# Patient Record
Sex: MALE | Race: WHITE | NOT HISPANIC OR LATINO | URBAN - METROPOLITAN AREA
[De-identification: names, ages, dates, MRNs, and addresses within clinical notes are randomized per-mention and may not be internally consistent; named-entity substitution may affect disease eponyms.]

---

## 2018-05-03 PROBLEM — E78.5 HYPERLIPIDEMIA: Status: ACTIVE | Noted: 2018-05-03

## 2018-05-03 RX ORDER — LISINOPRIL 2.5 MG/1
2.5 TABLET ORAL DAILY
COMMUNITY
Start: 2018-01-16 | End: 2018-05-11 | Stop reason: SDUPTHER

## 2018-05-03 RX ORDER — ATORVASTATIN CALCIUM 20 MG/1
20 TABLET, FILM COATED ORAL DAILY
COMMUNITY
Start: 2017-05-12 | End: 2018-05-11 | Stop reason: SDUPTHER

## 2018-05-03 RX ORDER — GLIPIZIDE 10 MG/1
10 TABLET ORAL SEE ADMIN INSTRUCTIONS
COMMUNITY
Start: 2017-05-12 | End: 2018-05-11 | Stop reason: SDUPTHER

## 2018-05-04 ENCOUNTER — CLINICAL SUPPORT (OUTPATIENT)
Dept: FAMILY MEDICINE | Facility: CLINIC | Age: 63
End: 2018-05-04
Payer: COMMERCIAL

## 2018-05-04 DIAGNOSIS — E11.21 TYPE II DIABETES MELLITUS WITH NEPHROPATHY (CMS/HCC): Primary | ICD-10-CM

## 2018-05-04 PROCEDURE — 36415 COLL VENOUS BLD VENIPUNCTURE: CPT | Performed by: FAMILY MEDICINE

## 2018-05-07 LAB
ALBUMIN SERPL-MCNC: 4 G/DL (ref 3.6–5.1)
ALBUMIN/CREAT UR: 37 MCG/MG CREAT
ALBUMIN/GLOB SERPL: 1.1 (CALC) (ref 1–2.5)
ALP SERPL-CCNC: 86 U/L (ref 40–115)
ALT SERPL-CCNC: 23 U/L (ref 9–46)
AST SERPL-CCNC: 21 U/L (ref 10–35)
BILIRUB SERPL-MCNC: 0.8 MG/DL (ref 0.2–1.2)
BUN SERPL-MCNC: 15 MG/DL (ref 7–25)
BUN/CREAT SERPL: ABNORMAL (CALC) (ref 6–22)
CALCIUM SERPL-MCNC: 8.9 MG/DL (ref 8.6–10.3)
CHLORIDE SERPL-SCNC: 104 MMOL/L (ref 98–110)
CHOLEST SERPL-MCNC: 174 MG/DL
CHOLEST/HDLC SERPL: 3.7 (CALC)
CO2 SERPL-SCNC: 27 MMOL/L (ref 20–31)
CREAT SERPL-MCNC: 0.96 MG/DL (ref 0.7–1.25)
CREAT UR-MCNC: 212 MG/DL (ref 20–370)
ERYTHROCYTE [DISTWIDTH] IN BLOOD BY AUTOMATED COUNT: 13.5 % (ref 11–15)
GFR SERPL CREATININE-BSD FRML MDRD: 83 ML/MIN/1.73M2
GLOBULIN SER CALC-MCNC: 3.5 G/DL (CALC) (ref 1.9–3.7)
GLUCOSE SERPL-MCNC: 172 MG/DL (ref 65–99)
HBA1C MFR BLD: 10.1 % OF TOTAL HGB
HCT VFR BLD AUTO: 46.2 % (ref 38.5–50)
HDLC SERPL-MCNC: 47 MG/DL
HGB BLD-MCNC: 15.1 G/DL (ref 13.2–17.1)
LDLC SERPL CALC-MCNC: 106 MG/DL (CALC)
MCH RBC QN AUTO: 28.8 PG (ref 27–33)
MCHC RBC AUTO-ENTMCNC: 32.7 G/DL (ref 32–36)
MCV RBC AUTO: 88 FL (ref 80–100)
MICROALBUMIN UR-MCNC: 7.8 MG/DL
NONHDLC SERPL-MCNC: 127 MG/DL (CALC)
PLATELET # BLD AUTO: 216 THOUSAND/UL (ref 140–400)
PMV BLD REES-ECKER: 10.4 FL (ref 7.5–12.5)
POTASSIUM SERPL-SCNC: 4.4 MMOL/L (ref 3.5–5.3)
PROT SERPL-MCNC: 7.5 G/DL (ref 6.1–8.1)
RBC # BLD AUTO: 5.25 MILLION/UL (ref 4.2–5.8)
SODIUM SERPL-SCNC: 139 MMOL/L (ref 135–146)
TRIGL SERPL-MCNC: 118 MG/DL
TSH SERPL-ACNC: 0.82 MIU/L (ref 0.4–4.5)
WBC # BLD AUTO: 8.7 THOUSAND/UL (ref 3.8–10.8)

## 2018-05-09 ENCOUNTER — TELEPHONE (OUTPATIENT)
Dept: FAMILY MEDICINE | Facility: CLINIC | Age: 63
End: 2018-05-09

## 2018-05-11 ENCOUNTER — OFFICE VISIT (OUTPATIENT)
Dept: FAMILY MEDICINE | Facility: CLINIC | Age: 63
End: 2018-05-11
Payer: COMMERCIAL

## 2018-05-11 VITALS
OXYGEN SATURATION: 98 % | DIASTOLIC BLOOD PRESSURE: 80 MMHG | HEART RATE: 70 BPM | TEMPERATURE: 97.7 F | HEIGHT: 71 IN | WEIGHT: 219 LBS | BODY MASS INDEX: 30.66 KG/M2 | SYSTOLIC BLOOD PRESSURE: 130 MMHG

## 2018-05-11 DIAGNOSIS — E78.2 MIXED HYPERLIPIDEMIA: ICD-10-CM

## 2018-05-11 DIAGNOSIS — E11.65 TYPE 2 DIABETES MELLITUS WITH HYPERGLYCEMIA, WITHOUT LONG-TERM CURRENT USE OF INSULIN (CMS/HCC): Primary | ICD-10-CM

## 2018-05-11 PROCEDURE — 99214 OFFICE O/P EST MOD 30 MIN: CPT | Mod: 25 | Performed by: FAMILY MEDICINE

## 2018-05-11 PROCEDURE — 36415 COLL VENOUS BLD VENIPUNCTURE: CPT | Performed by: FAMILY MEDICINE

## 2018-05-11 RX ORDER — ATORVASTATIN CALCIUM 20 MG/1
TABLET, FILM COATED ORAL
Qty: 90 TABLET | Refills: 0 | Status: SHIPPED | OUTPATIENT
Start: 2018-05-11 | End: 2018-08-06 | Stop reason: SDUPTHER

## 2018-05-11 RX ORDER — GLIPIZIDE 10 MG/1
TABLET ORAL
Qty: 90 TABLET | Refills: 1 | Status: SHIPPED | OUTPATIENT
Start: 2018-05-11 | End: 2018-11-01 | Stop reason: SDUPTHER

## 2018-05-11 RX ORDER — METFORMIN HYDROCHLORIDE 1000 MG/1
1000 TABLET ORAL 2 TIMES DAILY WITH MEALS
Qty: 180 TABLET | Refills: 1 | Status: SHIPPED | OUTPATIENT
Start: 2018-05-11 | End: 2018-11-01 | Stop reason: SDUPTHER

## 2018-05-11 RX ORDER — LISINOPRIL 2.5 MG/1
2.5 TABLET ORAL DAILY
Qty: 90 TABLET | Refills: 1 | Status: SHIPPED | OUTPATIENT
Start: 2018-05-11 | End: 2018-11-01 | Stop reason: SDUPTHER

## 2018-05-11 NOTE — PROGRESS NOTES
Subjective      Patient ID: Venu Phillips is a 63 y.o. male.    Off all meds  Diet is good    Natural /home cooked foods    Genes, father had dm      Cardiology      W/o complaints    Wife with questions concerns, though with correct lifestyle, dm would get better    D/w medication needed, will go for dm teaching    Also d/w other facets ie renal/cv protection    Will restart meds, f/u 1 month        The following have been reviewed and updated as appropriate in this visit:         Past Medical History: He  has a past medical history of Hypertension; Lipid disorder; and Type 2 diabetes mellitus (CMS/HCC) (HCC).  Past Surgical History: He  has a past surgical history that includes Hernia repair.  Medication: He has a current medication list which includes the following prescription(s): atorvastatin, glipizide, and lisinopril.  Allergies: He is allergic to no known allergies.  Social History: He  reports that he has quit smoking. He has quit using smokeless tobacco. He reports that he drinks alcohol. He reports that he does not use drugs.    Review of Systems:  Review of Systems   Constitutional: Negative for unexpected weight change.   Endocrine: Negative for polydipsia, polyphagia and polyuria.         Objective     Physical Exam   Constitutional: He is oriented to person, place, and time. He appears well-developed and well-nourished. No distress.   HENT:   Head: Normocephalic and atraumatic.   Right Ear: External ear normal.   Left Ear: External ear normal.   Nose: Nose normal.   Mouth/Throat: Oropharynx is clear and moist.   Eyes: Conjunctivae and EOM are normal. Pupils are equal, round, and reactive to light. Right eye exhibits no discharge. Left eye exhibits no discharge. No scleral icterus.   Neck: No JVD present. No tracheal deviation present.   Cardiovascular: Normal rate, regular rhythm and normal heart sounds.  Exam reveals no gallop and no friction rub.    No murmur heard.  Pulmonary/Chest: Effort normal and  breath sounds normal. No stridor. No respiratory distress. He has no wheezes. He has no rales.   Abdominal: Bowel sounds are normal.   obese   Musculoskeletal: Normal range of motion. He exhibits no edema or deformity.   Neurological: He is alert and oriented to person, place, and time. No cranial nerve deficit. Coordination normal.   Skin: Skin is warm and dry. He is not diaphoretic. No erythema. No pallor.   Psychiatric: He has a normal mood and affect. His behavior is normal. Judgment and thought content normal.   Vitals reviewed.      Assessment/Plan   Problem List Items Addressed This Visit     Hyperlipidemia     Start ace/asa         Relevant Medications    atorvastatin (LIPITOR) 20 mg tablet    Type 2 diabetes mellitus with hyperglycemia, without long-term current use of insulin (CMS/[facility]) ([facility]) - Primary     D/w 30 min, will restart dm meds, as well as ace, will go for dm teaching  F/u 1 month         Relevant Medications    glipiZIDE (GLUCOTROL) 10 mg tablet    metFORMIN (GLUCOPHAGE) 1,000 mg tablet        No problem-specific Assessment & Plan notes found for this encounter.    Problem List     None

## 2018-05-16 PROBLEM — E11.65 TYPE 2 DIABETES MELLITUS WITH HYPERGLYCEMIA, WITHOUT LONG-TERM CURRENT USE OF INSULIN (CMS/HCC): Status: ACTIVE | Noted: 2018-05-16

## 2018-05-16 ASSESSMENT — ENCOUNTER SYMPTOMS
UNEXPECTED WEIGHT CHANGE: 0
POLYPHAGIA: 0
POLYDIPSIA: 0

## 2018-06-01 ENCOUNTER — CLINICAL SUPPORT (OUTPATIENT)
Dept: FAMILY MEDICINE | Facility: CLINIC | Age: 63
End: 2018-06-01
Payer: COMMERCIAL

## 2018-06-01 DIAGNOSIS — E11.65 TYPE 2 DIABETES MELLITUS WITH HYPERGLYCEMIA, WITHOUT LONG-TERM CURRENT USE OF INSULIN (CMS/HCC): Primary | ICD-10-CM

## 2018-06-03 LAB — HBA1C MFR BLD: 8.4 % OF TOTAL HGB

## 2018-06-11 ENCOUNTER — TELEPHONE (OUTPATIENT)
Dept: FAMILY MEDICINE | Facility: CLINIC | Age: 63
End: 2018-06-11

## 2018-06-12 ENCOUNTER — TELEPHONE (OUTPATIENT)
Dept: FAMILY MEDICINE | Facility: CLINIC | Age: 63
End: 2018-06-12

## 2018-07-20 ENCOUNTER — CLINICAL SUPPORT (OUTPATIENT)
Dept: FAMILY MEDICINE | Facility: CLINIC | Age: 63
End: 2018-07-20
Payer: COMMERCIAL

## 2018-07-20 DIAGNOSIS — E11.9 TYPE 2 DIABETES MELLITUS WITHOUT COMPLICATION, UNSPECIFIED LONG TERM INSULIN USE STATUS: Primary | ICD-10-CM

## 2018-07-20 PROCEDURE — 36415 COLL VENOUS BLD VENIPUNCTURE: CPT | Performed by: FAMILY MEDICINE

## 2018-07-21 LAB — HBA1C MFR BLD: 6.8 % OF TOTAL HGB

## 2018-07-23 ENCOUNTER — TELEPHONE (OUTPATIENT)
Dept: FAMILY MEDICINE | Facility: CLINIC | Age: 63
End: 2018-07-23

## 2018-08-07 RX ORDER — ATORVASTATIN CALCIUM 20 MG/1
20 TABLET, FILM COATED ORAL DAILY
Qty: 90 TABLET | Refills: 0 | Status: SHIPPED | OUTPATIENT
Start: 2018-08-07 | End: 2022-07-24

## 2018-08-07 NOTE — TELEPHONE ENCOUNTER
Last visit 05/11/18.  Last refill 05/1118. Patient would like to have a glucose meter for testing blood sugar.

## 2018-08-24 RX ORDER — INSULIN PUMP SYRINGE, 3 ML
EACH MISCELLANEOUS
Qty: 1 EACH | Refills: 0 | Status: SHIPPED | OUTPATIENT
Start: 2018-08-24 | End: 2023-05-01

## 2018-08-24 RX ORDER — LANCING DEVICE
1 EACH MISCELLANEOUS DAILY
Qty: 1 EACH | Refills: 0 | Status: SHIPPED | OUTPATIENT
Start: 2018-08-24 | End: 2023-05-01

## 2018-08-24 RX ORDER — INSULIN PUMP SYRINGE, 3 ML
1 EACH MISCELLANEOUS AS NEEDED
Qty: 1 EACH | Refills: 0 | Status: SHIPPED | OUTPATIENT
Start: 2018-08-24 | End: 2023-05-01

## 2018-08-24 RX ORDER — LANCETS 33 GAUGE
1 EACH MISCELLANEOUS DAILY
Qty: 100 EACH | Refills: 1 | Status: SHIPPED | OUTPATIENT
Start: 2018-08-24 | End: 2023-05-01

## 2018-10-12 ENCOUNTER — OFFICE VISIT (OUTPATIENT)
Dept: FAMILY MEDICINE | Facility: CLINIC | Age: 63
End: 2018-10-12
Payer: COMMERCIAL

## 2018-10-12 VITALS
DIASTOLIC BLOOD PRESSURE: 72 MMHG | TEMPERATURE: 98.4 F | BODY MASS INDEX: 30.66 KG/M2 | OXYGEN SATURATION: 99 % | WEIGHT: 219 LBS | HEART RATE: 72 BPM | SYSTOLIC BLOOD PRESSURE: 130 MMHG | HEIGHT: 71 IN

## 2018-10-12 DIAGNOSIS — I10 DIABETES MELLITUS WITH COINCIDENT HYPERTENSION (CMS/HCC)  (CMS/HCC): ICD-10-CM

## 2018-10-12 DIAGNOSIS — E11.9 DIABETES MELLITUS WITH COINCIDENT HYPERTENSION (CMS/HCC)  (CMS/HCC): ICD-10-CM

## 2018-10-12 DIAGNOSIS — E11.69 DM TYPE 2 WITH DIABETIC MIXED HYPERLIPIDEMIA (CMS/HCC)  (CMS/HCC): Primary | ICD-10-CM

## 2018-10-12 DIAGNOSIS — Z12.5 ENCOUNTER FOR PROSTATE CANCER SCREENING: ICD-10-CM

## 2018-10-12 DIAGNOSIS — Z12.11 SCREENING FOR COLORECTAL CANCER: ICD-10-CM

## 2018-10-12 DIAGNOSIS — Z23 NEED FOR DIPHTHERIA-TETANUS-PERTUSSIS (TDAP) VACCINE: ICD-10-CM

## 2018-10-12 DIAGNOSIS — E78.2 DM TYPE 2 WITH DIABETIC MIXED HYPERLIPIDEMIA (CMS/HCC)  (CMS/HCC): Primary | ICD-10-CM

## 2018-10-12 DIAGNOSIS — Z12.12 SCREENING FOR COLORECTAL CANCER: ICD-10-CM

## 2018-10-12 DIAGNOSIS — Z23 INFLUENZA VACCINE NEEDED: ICD-10-CM

## 2018-10-12 PROCEDURE — 90715 TDAP VACCINE 7 YRS/> IM: CPT | Performed by: FAMILY MEDICINE

## 2018-10-12 PROCEDURE — 90472 IMMUNIZATION ADMIN EACH ADD: CPT | Performed by: FAMILY MEDICINE

## 2018-10-12 PROCEDURE — 99214 OFFICE O/P EST MOD 30 MIN: CPT | Mod: 25 | Performed by: FAMILY MEDICINE

## 2018-10-12 PROCEDURE — 36415 COLL VENOUS BLD VENIPUNCTURE: CPT | Performed by: FAMILY MEDICINE

## 2018-10-12 PROCEDURE — 90653 IIV ADJUVANT VACCINE IM: CPT | Performed by: FAMILY MEDICINE

## 2018-10-12 PROCEDURE — 90471 IMMUNIZATION ADMIN: CPT | Performed by: FAMILY MEDICINE

## 2018-10-12 ASSESSMENT — ENCOUNTER SYMPTOMS
HEADACHES: 0
DIABETIC ASSOCIATED SYMPTOMS: 0
TREMORS: 0
SWEATS: 0
DIZZINESS: 0
NERVOUS/ANXIOUS: 0

## 2018-10-12 NOTE — PROGRESS NOTES
"  Subjective     Patient ID: Venu Phillips is a 63 y.o. male.    Diabetes   He presents for his follow-up diabetic visit. He has type 2 diabetes mellitus. His disease course has been stable. Pertinent negatives for hypoglycemia include no dizziness, headaches, nervousness/anxiousness, sweats or tremors. There are no diabetic associated symptoms. There are no hypoglycemic complications. Symptoms are stable. There are no diabetic complications. There are no known risk factors for coronary artery disease. Current diabetic treatment includes oral agent (dual therapy). He is compliant with treatment most of the time. His weight is fluctuating minimally. He is following a generally healthy diet. He has not had a previous visit with a dietitian. His home blood glucose trend is fluctuating minimally. He does not see a podiatrist.Eye exam current: pt reports exam scheduled        Review of Systems   Neurological: Negative for dizziness, tremors and headaches.   Psychiatric/Behavioral: The patient is not nervous/anxious.    All other systems reviewed and are negative.      Objective     Vitals:    10/12/18 0815   BP: 130/72   BP Location: Left upper arm   Patient Position: Sitting   Pulse: 72   Temp: 36.9 °C (98.4 °F)   TempSrc: Oral   SpO2: 99%   Weight: 99.3 kg (219 lb)   Height: 1.803 m (5' 11\")     Body mass index is 30.54 kg/m².    Physical Exam   Constitutional: He is oriented to person, place, and time. He appears well-developed and well-nourished.   HENT:   Head: Normocephalic.   Eyes: Pupils are equal, round, and reactive to light.   Neck: Normal range of motion.   Cardiovascular: Normal rate, regular rhythm and normal heart sounds.    Pulmonary/Chest: Effort normal and breath sounds normal.   Genitourinary: Prostate normal.   Musculoskeletal: Normal range of motion.   Neurological: He is alert and oriented to person, place, and time.   Skin: Skin is warm and dry.   Psychiatric: He has a normal mood and affect.   Vitals " reviewed.      Assessment/Plan   Problem List Items Addressed This Visit     DM type 2 with diabetic mixed hyperlipidemia (CMS/HCC) (HCC) - Primary    Relevant Orders    Lipid panel    CBC and Differential    Comprehensive metabolic panel    Hemoglobin A1c    Microalbumin/Creatinine Ur Random    Diabetes mellitus with coincident hypertension (CMS/HCC)    Relevant Orders    Lipid panel    CBC and Differential    Comprehensive metabolic panel    Hemoglobin A1c    Microalbumin/Creatinine Ur Random      Other Visit Diagnoses     Screening for colorectal cancer        Encounter for prostate cancer screening        Relevant Orders    PSA    Fecal Immunochemical      Advised to follow up in 3 months.     Huyen CHA, am scribing for, and in the presence of, Mayank Acosta DO.    10/12/2018 8:44 AM    IMayank DO, personally performed the services described in this documentation as scribed by Huyen Chacon in my presence, and it is both accurate and complete.

## 2018-10-15 LAB
ALBUMIN SERPL-MCNC: 3.8 G/DL (ref 3.6–5.1)
ALBUMIN/CREAT UR: 19 MCG/MG CREAT
ALBUMIN/GLOB SERPL: 1.2 (CALC) (ref 1–2.5)
ALP SERPL-CCNC: 63 U/L (ref 40–115)
ALT SERPL-CCNC: 18 U/L (ref 9–46)
AST SERPL-CCNC: 20 U/L (ref 10–35)
BASOPHILS # BLD AUTO: 19 CELLS/UL (ref 0–200)
BASOPHILS NFR BLD AUTO: 0.3 %
BILIRUB SERPL-MCNC: 0.7 MG/DL (ref 0.2–1.2)
BUN SERPL-MCNC: 15 MG/DL (ref 7–25)
BUN/CREAT SERPL: ABNORMAL (CALC) (ref 6–22)
CALCIUM SERPL-MCNC: 8.9 MG/DL (ref 8.6–10.3)
CHLORIDE SERPL-SCNC: 104 MMOL/L (ref 98–110)
CHOLEST SERPL-MCNC: 207 MG/DL
CHOLEST/HDLC SERPL: 4.6 (CALC)
CO2 SERPL-SCNC: 27 MMOL/L (ref 20–32)
CREAT SERPL-MCNC: 1.03 MG/DL (ref 0.7–1.25)
CREAT UR-MCNC: 79 MG/DL (ref 20–320)
EOSINOPHIL # BLD AUTO: 120 CELLS/UL (ref 15–500)
EOSINOPHIL NFR BLD AUTO: 1.9 %
ERYTHROCYTE [DISTWIDTH] IN BLOOD BY AUTOMATED COUNT: 13.3 % (ref 11–15)
GFR SERPL CREATININE-BSD FRML MDRD: 76 ML/MIN/1.73M2
GLOBULIN SER CALC-MCNC: 3.2 G/DL (CALC) (ref 1.9–3.7)
GLUCOSE SERPL-MCNC: 130 MG/DL (ref 65–99)
HBA1C MFR BLD: 6.3 % OF TOTAL HGB
HCT VFR BLD AUTO: 42.9 % (ref 38.5–50)
HDLC SERPL-MCNC: 45 MG/DL
HGB BLD-MCNC: 14.5 G/DL (ref 13.2–17.1)
LDLC SERPL CALC-MCNC: 140 MG/DL (CALC)
LYMPHOCYTES # BLD AUTO: 2318 CELLS/UL (ref 850–3900)
LYMPHOCYTES NFR BLD AUTO: 36.8 %
MCH RBC QN AUTO: 29.4 PG (ref 27–33)
MCHC RBC AUTO-ENTMCNC: 33.8 G/DL (ref 32–36)
MCV RBC AUTO: 87 FL (ref 80–100)
MICROALBUMIN UR-MCNC: 1.5 MG/DL
MONOCYTES # BLD AUTO: 611 CELLS/UL (ref 200–950)
MONOCYTES NFR BLD AUTO: 9.7 %
NEUTROPHILS # BLD AUTO: 3232 CELLS/UL (ref 1500–7800)
NEUTROPHILS NFR BLD AUTO: 51.3 %
NONHDLC SERPL-MCNC: 162 MG/DL (CALC)
PLATELET # BLD AUTO: 212 THOUSAND/UL (ref 140–400)
PMV BLD REES-ECKER: 10.5 FL (ref 7.5–12.5)
POTASSIUM SERPL-SCNC: 4.5 MMOL/L (ref 3.5–5.3)
PROT SERPL-MCNC: 7 G/DL (ref 6.1–8.1)
PSA SERPL-MCNC: 0.8 NG/ML
RBC # BLD AUTO: 4.93 MILLION/UL (ref 4.2–5.8)
SODIUM SERPL-SCNC: 138 MMOL/L (ref 135–146)
TRIGL SERPL-MCNC: 105 MG/DL
WBC # BLD AUTO: 6.3 THOUSAND/UL (ref 3.8–10.8)

## 2018-10-23 LAB
QUEST (ALWAYS MESSAGE): NORMAL
QUEST ORDER CODE:: NORMAL
QUEST QUESTION/PROBLEM:: NORMAL
QUEST RESOLUTION:: NORMAL

## 2018-11-02 RX ORDER — GLIPIZIDE 10 MG/1
10 TABLET ORAL
Qty: 90 TABLET | Refills: 1 | Status: SHIPPED | OUTPATIENT
Start: 2018-11-02 | End: 2019-01-31 | Stop reason: HOSPADM

## 2018-11-02 RX ORDER — METFORMIN HYDROCHLORIDE 1000 MG/1
1000 TABLET ORAL 2 TIMES DAILY WITH MEALS
Qty: 180 TABLET | Refills: 1 | Status: SHIPPED | OUTPATIENT
Start: 2018-11-02 | End: 2019-04-15 | Stop reason: SDUPTHER

## 2018-11-02 RX ORDER — LISINOPRIL 2.5 MG/1
2.5 TABLET ORAL
Qty: 90 TABLET | Refills: 1 | Status: SHIPPED | OUTPATIENT
Start: 2018-11-02 | End: 2019-04-30 | Stop reason: SDUPTHER

## 2018-11-05 RX ORDER — GLIPIZIDE 10 MG/1
10 TABLET ORAL
Qty: 90 TABLET | Refills: 1 | OUTPATIENT
Start: 2018-11-05 | End: 2019-02-03

## 2018-11-14 ENCOUNTER — TELEPHONE (OUTPATIENT)
Dept: FAMILY MEDICINE | Facility: CLINIC | Age: 63
End: 2018-11-14

## 2019-04-15 RX ORDER — METFORMIN HYDROCHLORIDE 1000 MG/1
1000 TABLET ORAL 2 TIMES DAILY WITH MEALS
Qty: 60 TABLET | Refills: 1 | Status: SHIPPED | OUTPATIENT
Start: 2019-04-15 | End: 2021-01-25 | Stop reason: SDUPTHER

## 2019-04-26 ENCOUNTER — OFFICE VISIT (OUTPATIENT)
Dept: FAMILY MEDICINE | Facility: CLINIC | Age: 64
End: 2019-04-26
Payer: COMMERCIAL

## 2019-04-26 VITALS
WEIGHT: 221 LBS | HEART RATE: 59 BPM | HEIGHT: 71 IN | TEMPERATURE: 98.1 F | OXYGEN SATURATION: 97 % | BODY MASS INDEX: 30.94 KG/M2 | RESPIRATION RATE: 16 BRPM

## 2019-04-26 DIAGNOSIS — I10 DIABETES MELLITUS WITH COINCIDENT HYPERTENSION (CMS/HCC)  (CMS/HCC): ICD-10-CM

## 2019-04-26 DIAGNOSIS — E11.9 DIABETES MELLITUS WITH COINCIDENT HYPERTENSION (CMS/HCC)  (CMS/HCC): ICD-10-CM

## 2019-04-26 DIAGNOSIS — Z12.5 SCREENING PSA (PROSTATE SPECIFIC ANTIGEN): ICD-10-CM

## 2019-04-26 DIAGNOSIS — E78.2 DM TYPE 2 WITH DIABETIC MIXED HYPERLIPIDEMIA (CMS/HCC)  (CMS/HCC): Primary | ICD-10-CM

## 2019-04-26 DIAGNOSIS — E11.69 DM TYPE 2 WITH DIABETIC MIXED HYPERLIPIDEMIA (CMS/HCC)  (CMS/HCC): Primary | ICD-10-CM

## 2019-04-26 PROCEDURE — 99213 OFFICE O/P EST LOW 20 MIN: CPT | Mod: 25 | Performed by: FAMILY MEDICINE

## 2019-04-26 PROCEDURE — 36415 COLL VENOUS BLD VENIPUNCTURE: CPT | Performed by: FAMILY MEDICINE

## 2019-04-26 ASSESSMENT — ENCOUNTER SYMPTOMS
RESPIRATORY NEGATIVE: 1
GASTROINTESTINAL NEGATIVE: 1
CHEST TIGHTNESS: 0
WEAKNESS: 0
BLURRED VISION: 0
MUSCULOSKELETAL NEGATIVE: 1
FATIGUE: 0
SHORTNESS OF BREATH: 0
UNEXPECTED WEIGHT CHANGE: 0
DIABETIC ASSOCIATED SYMPTOMS: 0
PSYCHIATRIC NEGATIVE: 1
WEIGHT LOSS: 0
PALPITATIONS: 0

## 2019-04-27 LAB
ALBUMIN SERPL-MCNC: 4.1 G/DL (ref 3.6–5.1)
ALBUMIN/GLOB SERPL: 1.2 (CALC) (ref 1–2.5)
ALP SERPL-CCNC: 70 U/L (ref 40–115)
ALT SERPL-CCNC: 20 U/L (ref 9–46)
AST SERPL-CCNC: 21 U/L (ref 10–35)
BASOPHILS # BLD AUTO: 20 CELLS/UL (ref 0–200)
BASOPHILS NFR BLD AUTO: 0.3 %
BILIRUB SERPL-MCNC: 0.7 MG/DL (ref 0.2–1.2)
BUN SERPL-MCNC: 17 MG/DL (ref 7–25)
BUN/CREAT SERPL: ABNORMAL (CALC) (ref 6–22)
CALCIUM SERPL-MCNC: 9.2 MG/DL (ref 8.6–10.3)
CHLORIDE SERPL-SCNC: 103 MMOL/L (ref 98–110)
CHOLEST SERPL-MCNC: 231 MG/DL
CHOLEST/HDLC SERPL: 4.6 (CALC)
CO2 SERPL-SCNC: 28 MMOL/L (ref 20–32)
CREAT SERPL-MCNC: 1.07 MG/DL (ref 0.7–1.25)
EOSINOPHIL # BLD AUTO: 150 CELLS/UL (ref 15–500)
EOSINOPHIL NFR BLD AUTO: 2.2 %
ERYTHROCYTE [DISTWIDTH] IN BLOOD BY AUTOMATED COUNT: 13.3 % (ref 11–15)
GLOBULIN SER CALC-MCNC: 3.3 G/DL (CALC) (ref 1.9–3.7)
GLUCOSE SERPL-MCNC: 161 MG/DL (ref 65–99)
HBA1C MFR BLD: 7.1 % OF TOTAL HGB
HCT VFR BLD AUTO: 44.9 % (ref 38.5–50)
HDLC SERPL-MCNC: 50 MG/DL
HGB BLD-MCNC: 14.9 G/DL (ref 13.2–17.1)
LDLC SERPL CALC-MCNC: 159 MG/DL (CALC)
LYMPHOCYTES # BLD AUTO: 1918 CELLS/UL (ref 850–3900)
LYMPHOCYTES NFR BLD AUTO: 28.2 %
MCH RBC QN AUTO: 29 PG (ref 27–33)
MCHC RBC AUTO-ENTMCNC: 33.2 G/DL (ref 32–36)
MCV RBC AUTO: 87.4 FL (ref 80–100)
MONOCYTES # BLD AUTO: 666 CELLS/UL (ref 200–950)
MONOCYTES NFR BLD AUTO: 9.8 %
NEUTROPHILS # BLD AUTO: 4046 CELLS/UL (ref 1500–7800)
NEUTROPHILS NFR BLD AUTO: 59.5 %
NONHDLC SERPL-MCNC: 181 MG/DL (CALC)
PLATELET # BLD AUTO: 228 THOUSAND/UL (ref 140–400)
PMV BLD REES-ECKER: 10.2 FL (ref 7.5–12.5)
POTASSIUM SERPL-SCNC: 4.9 MMOL/L (ref 3.5–5.3)
PROT SERPL-MCNC: 7.4 G/DL (ref 6.1–8.1)
PSA SERPL-MCNC: 0.7 NG/ML
QUEST EGFR NON-AFR. AMERICAN: 72 ML/MIN/1.73M2
RBC # BLD AUTO: 5.14 MILLION/UL (ref 4.2–5.8)
SODIUM SERPL-SCNC: 138 MMOL/L (ref 135–146)
TRIGL SERPL-MCNC: 108 MG/DL
WBC # BLD AUTO: 6.8 THOUSAND/UL (ref 3.8–10.8)

## 2019-05-01 RX ORDER — LISINOPRIL 2.5 MG/1
2.5 TABLET ORAL
Qty: 90 TABLET | Refills: 1 | Status: SHIPPED | OUTPATIENT
Start: 2019-05-01 | End: 2019-11-07 | Stop reason: SDUPTHER

## 2019-05-01 RX ORDER — GLIPIZIDE 10 MG/1
10 TABLET ORAL
Qty: 90 TABLET | Refills: 1 | Status: SHIPPED | OUTPATIENT
Start: 2019-05-01 | End: 2019-11-07 | Stop reason: SDUPTHER

## 2019-05-04 ENCOUNTER — TELEPHONE (OUTPATIENT)
Dept: FAMILY MEDICINE | Facility: CLINIC | Age: 64
End: 2019-05-04

## 2019-06-05 ENCOUNTER — TELEPHONE (OUTPATIENT)
Dept: FAMILY MEDICINE | Facility: CLINIC | Age: 64
End: 2019-06-05

## 2019-06-05 NOTE — TELEPHONE ENCOUNTER
Patients wife Rnee called requesting orer for Chest Xray cody to former smoker  Meant to ask for at last appt 5/4/19 Will be going to [place]  Would like Rx mailed to home  PT contact  [phone]

## 2019-06-06 DIAGNOSIS — R05.9 COUGH: Primary | ICD-10-CM

## 2019-06-19 ENCOUNTER — TELEPHONE (OUTPATIENT)
Dept: FAMILY MEDICINE | Facility: CLINIC | Age: 64
End: 2019-06-19

## 2019-06-19 ENCOUNTER — HOSPITAL ENCOUNTER (OUTPATIENT)
Dept: RADIOLOGY | Age: 64
Discharge: HOME | End: 2019-06-19
Attending: FAMILY MEDICINE
Payer: COMMERCIAL

## 2019-06-19 DIAGNOSIS — R05.9 COUGH: ICD-10-CM

## 2019-06-19 DIAGNOSIS — R93.89 ABNORMAL CHEST X-RAY: Primary | ICD-10-CM

## 2019-06-19 PROCEDURE — 71046 X-RAY EXAM CHEST 2 VIEWS: CPT

## 2019-06-19 NOTE — TELEPHONE ENCOUNTER
Paul from  radiology  requesting call back regarding xray of chest done today   Paul contact #  [phone]

## 2019-06-20 ENCOUNTER — TELEPHONE (OUTPATIENT)
Dept: FAMILY MEDICINE | Facility: CLINIC | Age: 64
End: 2019-06-20

## 2019-06-26 ENCOUNTER — HOSPITAL ENCOUNTER (OUTPATIENT)
Dept: RADIOLOGY | Age: 64
Discharge: HOME | End: 2019-06-26
Attending: PHYSICIAN ASSISTANT
Payer: COMMERCIAL

## 2019-06-26 DIAGNOSIS — R93.89 ABNORMAL CHEST X-RAY: ICD-10-CM

## 2019-06-26 PROCEDURE — 71250 CT THORAX DX C-: CPT

## 2019-06-27 ENCOUNTER — TELEPHONE (OUTPATIENT)
Dept: FAMILY MEDICINE | Facility: CLINIC | Age: 64
End: 2019-06-27

## 2019-06-27 DIAGNOSIS — K76.89 LIVER CYST: Primary | ICD-10-CM

## 2019-07-05 ENCOUNTER — CLINICAL SUPPORT (OUTPATIENT)
Dept: FAMILY MEDICINE | Facility: CLINIC | Age: 64
End: 2019-07-05
Payer: COMMERCIAL

## 2019-07-05 ENCOUNTER — HOSPITAL ENCOUNTER (OUTPATIENT)
Dept: RADIOLOGY | Age: 64
Discharge: HOME | End: 2019-07-05
Attending: FAMILY MEDICINE
Payer: COMMERCIAL

## 2019-07-05 DIAGNOSIS — R73.9 ELEVATED BLOOD SUGAR: Primary | ICD-10-CM

## 2019-07-05 DIAGNOSIS — K76.89 LIVER CYST: ICD-10-CM

## 2019-07-05 PROCEDURE — 36415 COLL VENOUS BLD VENIPUNCTURE: CPT | Performed by: FAMILY MEDICINE

## 2019-07-05 PROCEDURE — 76705 ECHO EXAM OF ABDOMEN: CPT

## 2019-07-06 LAB — HBA1C MFR BLD: 6.8 % OF TOTAL HGB

## 2019-07-10 ENCOUNTER — CLINICAL SUPPORT (OUTPATIENT)
Dept: FAMILY MEDICINE | Facility: CLINIC | Age: 64
End: 2019-07-10
Payer: COMMERCIAL

## 2019-07-10 DIAGNOSIS — E11.10 TYPE 2 DIABETES MELLITUS WITH KETOACIDOSIS WITHOUT COMA, WITHOUT LONG-TERM CURRENT USE OF INSULIN (CMS/HCC): Primary | ICD-10-CM

## 2019-07-10 PROCEDURE — 36415 COLL VENOUS BLD VENIPUNCTURE: CPT | Performed by: FAMILY MEDICINE

## 2019-07-12 LAB
BUN SERPL-MCNC: 18 MG/DL (ref 7–25)
CREAT SERPL-MCNC: 1.12 MG/DL (ref 0.7–1.25)
QUEST EGFR NON-AFR. AMERICAN: 68 ML/MIN/1.73M2

## 2019-07-15 DIAGNOSIS — K76.9 HEPATIC LESION: Primary | ICD-10-CM

## 2019-09-12 ENCOUNTER — TELEPHONE (OUTPATIENT)
Dept: FAMILY MEDICINE | Facility: CLINIC | Age: 64
End: 2019-09-12

## 2019-09-12 NOTE — TELEPHONE ENCOUNTER
Meesage was left for patient to call office. Dr Arora would like to know if he ever had a colonoscopy before.

## 2019-10-29 RX ORDER — LISINOPRIL 2.5 MG/1
TABLET ORAL
Qty: 90 TABLET | Refills: 1 | OUTPATIENT
Start: 2019-10-29

## 2019-10-29 RX ORDER — GLIPIZIDE 10 MG/1
10 TABLET ORAL
Qty: 90 TABLET | Refills: 1 | OUTPATIENT
Start: 2019-10-29

## 2019-11-07 RX ORDER — GLIPIZIDE 10 MG/1
10 TABLET ORAL
Qty: 90 TABLET | Refills: 1 | Status: SHIPPED | OUTPATIENT
Start: 2019-11-07 | End: 2020-04-27

## 2019-11-07 RX ORDER — LISINOPRIL 2.5 MG/1
2.5 TABLET ORAL DAILY
Qty: 90 TABLET | Refills: 1 | Status: SHIPPED | OUTPATIENT
Start: 2019-11-07 | End: 2020-04-27

## 2019-11-07 NOTE — TELEPHONE ENCOUNTER
Medicine Refill Request    Last Office Visit: 4/26/2019  Next Office Visit: 11/12/2019    Patient out of medication    Current Outpatient Medications:   •  atorvastatin (LIPITOR) 20 mg tablet, Take 1 tablet (20 mg total) by mouth daily., Disp: 90 tablet, Rfl: 0  •  blood glucose control, normal solution, 1 Bottle as needed (new strips). For testing new strips Dx Code E11.65  NPI [id number], Disp: 1 each, Rfl: 0  •  blood sugar diagnostic strip, 1 strip daily. For use with meter to check blood glucose testing once a day dx code E11.65  NPI # [id number], Disp: 100 strip, Rfl: 1  •  blood-glucose meter kit, Use as instructed for testing blood glucose  Once a day.  Dx Code E 11.65 NPI # [id number], Disp: 1 each, Rfl: 0  •  glipiZIDE (GLUCOTROL) 10 mg tablet, Take 1 tablet (10 mg total) by mouth daily with breakfast., Disp: 90 tablet, Rfl: 1  •  lancets 33 gauge misc, 1 each daily. Testing blood sugar Dx Code E11.65   NPI # [id number], Disp: 100 each, Rfl: 1  •  lancing device misc, 1 Device daily. Dx code E11.65  Testing for Blood sugar  NpI # [id number], Disp: 1 each, Rfl: 0  •  lisinopril (PRINIVIL) 2.5 mg tablet, Take 1 tablet (2.5 mg total) by mouth once daily., Disp: 90 tablet, Rfl: 1  •  metFORMIN (GLUCOPHAGE) 1,000 mg tablet, Take 1 tablet (1,000 mg total) by mouth 2 (two) times a day with meals., Disp: 60 tablet, Rfl: 1      BP Readings from Last 3 Encounters:   10/12/18 130/72   05/11/18 130/80       Recent Lab results:  Lab Results   Component Value Date    CHOL 231 (H) 04/26/2019   ,   Lab Results   Component Value Date    HDL 50 04/26/2019   ,   Lab Results   Component Value Date    LDLCALC 159 (H) 04/26/2019   ,   Lab Results   Component Value Date    TRIG 108 04/26/2019        Lab Results   Component Value Date    GLUCOSE 161 (H) 04/26/2019   ,   Lab Results   Component Value Date    HGBA1C 6.8 (H) 07/05/2019         Lab Results   Component Value Date    CREATININE 1.12 07/10/2019       Lab Results    Component Value Date    TSH 0.82 05/04/2018

## 2019-11-12 ENCOUNTER — TELEPHONE (OUTPATIENT)
Dept: FAMILY MEDICINE | Facility: CLINIC | Age: 64
End: 2019-11-12

## 2019-11-12 DIAGNOSIS — R91.8 LUNG NODULES: Primary | ICD-10-CM

## 2019-11-12 NOTE — TELEPHONE ENCOUNTER
Need new order for CT chest without contrast.    Chest x ray in June showed nodular density. Insurance said CT could not be approved for several months after x ray.    Please put new order in and I will go back to insurance to get prior auth

## 2019-11-13 NOTE — TELEPHONE ENCOUNTER
Per Dr. Acosta, order placed in [facility] for CT of the chest w/o contrast for lung nodules.

## 2019-11-14 ENCOUNTER — OFFICE VISIT (OUTPATIENT)
Dept: FAMILY MEDICINE | Facility: CLINIC | Age: 64
End: 2019-11-14
Payer: COMMERCIAL

## 2019-11-14 VITALS
HEIGHT: 71 IN | RESPIRATION RATE: 16 BRPM | OXYGEN SATURATION: 96 % | TEMPERATURE: 98.1 F | BODY MASS INDEX: 31.5 KG/M2 | WEIGHT: 225 LBS | SYSTOLIC BLOOD PRESSURE: 130 MMHG | DIASTOLIC BLOOD PRESSURE: 74 MMHG | HEART RATE: 69 BPM

## 2019-11-14 DIAGNOSIS — E78.2 DM TYPE 2 WITH DIABETIC MIXED HYPERLIPIDEMIA (CMS/HCC)  (CMS/HCC): ICD-10-CM

## 2019-11-14 DIAGNOSIS — E78.2 MIXED HYPERLIPIDEMIA: Primary | ICD-10-CM

## 2019-11-14 DIAGNOSIS — E11.69 DM TYPE 2 WITH DIABETIC MIXED HYPERLIPIDEMIA (CMS/HCC)  (CMS/HCC): ICD-10-CM

## 2019-11-14 PROCEDURE — 99213 OFFICE O/P EST LOW 20 MIN: CPT | Performed by: INTERNAL MEDICINE

## 2019-11-14 RX ORDER — METFORMIN HYDROCHLORIDE 1000 MG/1
1000 TABLET ORAL 2 TIMES DAILY WITH MEALS
Refills: 1 | COMMUNITY
Start: 2019-10-13 | End: 2020-01-10 | Stop reason: SDUPTHER

## 2019-11-16 ASSESSMENT — ENCOUNTER SYMPTOMS
ALLERGIC/IMMUNOLOGIC NEGATIVE: 1
CONSTITUTIONAL NEGATIVE: 1
NEUROLOGICAL NEGATIVE: 1
ENDOCRINE NEGATIVE: 1
ARTHRALGIAS: 1
RESPIRATORY NEGATIVE: 1
PSYCHIATRIC NEGATIVE: 1
CARDIOVASCULAR NEGATIVE: 1
GASTROINTESTINAL NEGATIVE: 1
HEMATOLOGIC/LYMPHATIC NEGATIVE: 1
EYES NEGATIVE: 1

## 2019-11-16 NOTE — ASSESSMENT & PLAN NOTE
Patient is doing well. Taking medications as prescribed. Does admit to not remembering to take metformin in the afternoon on some days. Is due for repeat labs January. Will put orders in for future labs today.

## 2019-11-16 NOTE — ASSESSMENT & PLAN NOTE
Patient is doing well. Taking medications as prescribed. Is due for repeat labs January. Will put orders in for future labs today. Encouraged continued healthy lifestyle choices, including exercise and dietary choices.

## 2019-11-16 NOTE — PROGRESS NOTES
"Subjective      Patient ID: Venu Phillips is a 64 y.o. male.  1955      Presents for follow up and medication evaluation. Is doing well. Taking meds as prescribed. No acute complaints offered.       The following have been reviewed and updated as appropriate in this visit:  Tobacco  Allergies  Problems  Med Hx  Surg Hx  Fam Hx       Review of Systems   Constitutional: Negative.    HENT: Negative.    Eyes: Negative.    Respiratory: Negative.    Cardiovascular: Negative.    Gastrointestinal: Negative.    Endocrine: Negative.    Genitourinary: Negative.    Musculoskeletal: Positive for arthralgias.        Occasional aches and pains, mostly relieved with activity.    Skin: Negative.    Allergic/Immunologic: Negative.    Neurological: Negative.    Hematological: Negative.    Psychiatric/Behavioral: Negative.        Objective     Vitals:    11/14/19 1607   BP: 130/74   BP Location: Left upper arm   Patient Position: Sitting   Pulse: 69   Resp: 16   Temp: 36.7 °C (98.1 °F)   SpO2: 96%   Weight: 102 kg (225 lb)   Height: 1.803 m (5' 11\")     Body mass index is 31.38 kg/m².    Physical Exam   Constitutional: He is oriented to person, place, and time. He appears well-developed and well-nourished.   HENT:   Head: Normocephalic and atraumatic.   Right Ear: External ear normal.   Left Ear: External ear normal.   Nose: Nose normal.   Mouth/Throat: Oropharynx is clear and moist.   TMs clear bilaterally.    Eyes: Pupils are equal, round, and reactive to light. Conjunctivae and EOM are normal.   Neck: Normal range of motion. Neck supple.   Cardiovascular: Normal rate, regular rhythm, normal heart sounds and intact distal pulses.   Pulmonary/Chest: Effort normal and breath sounds normal.   Abdominal: Soft. Bowel sounds are normal.   Musculoskeletal: Normal range of motion.   Neurological: He is alert and oriented to person, place, and time.   Skin: Skin is warm and dry. Capillary refill takes less than 2 seconds. "   Psychiatric: He has a normal mood and affect. His behavior is normal. Judgment and thought content normal.   Nursing note and vitals reviewed.      Assessment/Plan   Diagnoses and all orders for this visit:    Mixed hyperlipidemia (Primary)  Assessment & Plan:  Patient is doing well. Taking medications as prescribed. Is due for repeat labs January. Will put orders in for future labs today. Encouraged continued healthy lifestyle choices, including exercise and dietary choices.       DM type 2 with diabetic mixed hyperlipidemia (CMS/[facility])  Assessment & Plan:  Patient is doing well. Taking medications as prescribed. Does admit to not remembering to take metformin in the afternoon on some days. Is due for repeat labs January. Will put orders in for future labs today.

## 2019-11-19 ENCOUNTER — TELEPHONE (OUTPATIENT)
Dept: FAMILY MEDICINE | Facility: CLINIC | Age: 64
End: 2019-11-19

## 2019-11-19 DIAGNOSIS — R91.8 LUNG NODULES: Primary | ICD-10-CM

## 2019-11-19 NOTE — TELEPHONE ENCOUNTER
CT chest was denied by insurance. Patient was told to do a follow up CT for lung nodule. Explained this to insurance but said test is denied.    Can do a peer to peer if desired.    I have not yet notified patient. Please advise on what next step should be

## 2019-11-19 NOTE — TELEPHONE ENCOUNTER
Per Dr. Acosta, patient referred to Pulmonology for evaluation of pulmonary nodules. Order placed in Epic for Dr. Trevor You.

## 2020-01-10 RX ORDER — METFORMIN HYDROCHLORIDE 1000 MG/1
1000 TABLET ORAL 2 TIMES DAILY WITH MEALS
Qty: 180 TABLET | Refills: 0 | Status: SHIPPED | OUTPATIENT
Start: 2020-01-10 | End: 2020-11-02 | Stop reason: SDUPTHER

## 2020-01-10 NOTE — TELEPHONE ENCOUNTER
Medicine Refill Request    Last Office Visit: 11/14/2019  Next Office Visit: Visit date not found        Current Outpatient Medications:   •  atorvastatin (LIPITOR) 20 mg tablet, Take 1 tablet (20 mg total) by mouth daily., Disp: 90 tablet, Rfl: 0  •  blood glucose control, normal solution, 1 Bottle as needed (new strips). For testing new strips Dx Code E11.65  NPI [id number], Disp: 1 each, Rfl: 0  •  blood sugar diagnostic strip, 1 strip daily. For use with meter to check blood glucose testing once a day dx code E11.65  NPI # [id number], Disp: 100 strip, Rfl: 1  •  blood-glucose meter kit, Use as instructed for testing blood glucose  Once a day.  Dx Code E 11.65 NPI # [id number], Disp: 1 each, Rfl: 0  •  FLUZONE HIGH-DOSE 2019-20, PF, 180 mcg/0.5 mL syringe injection, , Disp: , Rfl:   •  glipiZIDE (GLUCOTROL) 10 mg tablet, Take 1 tablet (10 mg total) by mouth daily with breakfast., Disp: 90 tablet, Rfl: 1  •  lancets 33 gauge misc, 1 each daily. Testing blood sugar Dx Code E11.65   NPI # [id number], Disp: 100 each, Rfl: 1  •  lancing device misc, 1 Device daily. Dx code E11.65  Testing for Blood sugar  NpI # [id number], Disp: 1 each, Rfl: 0  •  lisinopril (PRINIVIL) 2.5 mg tablet, Take 1 tablet (2.5 mg total) by mouth daily., Disp: 90 tablet, Rfl: 1  •  metFORMIN (GLUCOPHAGE) 1,000 mg tablet, Take 1 tablet (1,000 mg total) by mouth 2 (two) times a day with meals., Disp: 60 tablet, Rfl: 1  •  metFORMIN (GLUCOPHAGE) 1,000 mg tablet, Take 1,000 mg by mouth 2 (two) times a day with meals., Disp: , Rfl: 1      BP Readings from Last 3 Encounters:   11/14/19 130/74   10/12/18 130/72   05/11/18 130/80       Recent Lab results:  Lab Results   Component Value Date    CHOL 231 (H) 04/26/2019   ,   Lab Results   Component Value Date    HDL 50 04/26/2019   ,   Lab Results   Component Value Date    LDLCALC 159 (H) 04/26/2019   ,   Lab Results   Component Value Date    TRIG 108 04/26/2019        Lab Results   Component Value Date     GLUCOSE 161 (H) 04/26/2019   ,   Lab Results   Component Value Date    HGBA1C 6.8 (H) 07/05/2019         Lab Results   Component Value Date    CREATININE 1.12 07/10/2019       Lab Results   Component Value Date    TSH 0.82 05/04/2018

## 2020-01-17 ENCOUNTER — TELEPHONE (OUTPATIENT)
Dept: FAMILY MEDICINE | Facility: CLINIC | Age: 65
End: 2020-01-17

## 2020-03-23 ENCOUNTER — TELEPHONE (OUTPATIENT)
Dept: FAMILY MEDICINE | Facility: CLINIC | Age: 65
End: 2020-03-23

## 2020-06-29 ENCOUNTER — TELEPHONE (OUTPATIENT)
Dept: FAMILY MEDICINE | Facility: CLINIC | Age: 65
End: 2020-06-29

## 2020-06-29 DIAGNOSIS — E78.2 DM TYPE 2 WITH DIABETIC MIXED HYPERLIPIDEMIA (CMS/HCC)  (CMS/HCC): Primary | ICD-10-CM

## 2020-06-29 DIAGNOSIS — E11.69 DM TYPE 2 WITH DIABETIC MIXED HYPERLIPIDEMIA (CMS/HCC)  (CMS/HCC): Primary | ICD-10-CM

## 2020-06-29 NOTE — TELEPHONE ENCOUNTER
Patient needs script for blood test. Had CDL physical done but needs bt to check for diabetic check.    Will  script on Tuesday

## 2020-07-01 ENCOUNTER — TELEMEDICINE (OUTPATIENT)
Dept: FAMILY MEDICINE | Facility: CLINIC | Age: 65
End: 2020-07-01
Payer: COMMERCIAL

## 2020-07-01 DIAGNOSIS — E78.2 DM TYPE 2 WITH DIABETIC MIXED HYPERLIPIDEMIA (CMS/HCC)  (CMS/HCC): Primary | ICD-10-CM

## 2020-07-01 DIAGNOSIS — E11.69 DM TYPE 2 WITH DIABETIC MIXED HYPERLIPIDEMIA (CMS/HCC)  (CMS/HCC): Primary | ICD-10-CM

## 2020-07-01 PROCEDURE — 99212 OFFICE O/P EST SF 10 MIN: CPT | Mod: 95 | Performed by: FAMILY MEDICINE

## 2020-07-01 NOTE — PROGRESS NOTES
Verification of Patient Location:  The patient affirms they are currently located in the following state:[place]     Request for Consent:  You and I are about to have a telemedicine check-in or visit. This is allowed because you are already my patient, and you have requested it.  This telemedicine visit will be billed to your health insurance or you, if you are self-insured.  You understand you will be responsible for any copayments or coinsurances that apply to your telemedicine visit.  Before starting our telemedicine visit, I am required to get your consent for this virtual check-in or visit by telemedicine. Do you consent?      Patient Response to Request for Consent: Yes    The following have been reviewed and updated as appropriate in this visit:  Tobacco  Allergies  Meds  Problems  Med Hx  Fam Hx  Soc Hx        Visit Documentation:  Иван Phillips is a 65 y.o. male for follow up of diabetes. Current symptoms include: none. Patient denies foot ulcerations, paresthesia of the feet, visual disturbances, vomiting and weight loss. Evaluation to date has been: hemoglobin A1C. Home sugars: patient does not check sugars. Current treatments: Continued metformin which has been effective. Dilated eye exam scheduled for 2 weeks.         Laboratory:  Lab Results   Component Value Date    HGBA1C 6.8 (H) 07/05/2019       Assessment/Plan     Diabetes mellitus Type II, under adequate control..    Follow up in 3 months or as needed.    Time Spent in Medical Discussion During This Encounter:  15 minutes     Huyen CHA, am scribing for, and in the presence of, Mayank Acosta DO.    7/1/2020 9:13 AM  Mayank CHA DO, personally performed the services described in this documentation as scribed by Huyen Chacon in my presence, and it is both accurate and complete.

## 2020-07-03 LAB — HBA1C MFR BLD: 7.2 % OF TOTAL HGB

## 2020-07-23 RX ORDER — LISINOPRIL 2.5 MG/1
2.5 TABLET ORAL
Qty: 90 TABLET | Refills: 0 | Status: SHIPPED | OUTPATIENT
Start: 2020-07-23 | End: 2020-10-26

## 2020-07-23 RX ORDER — GLIPIZIDE 10 MG/1
10 TABLET ORAL
Qty: 90 TABLET | Refills: 0 | Status: SHIPPED | OUTPATIENT
Start: 2020-07-23 | End: 2020-10-14

## 2020-07-23 NOTE — TELEPHONE ENCOUNTER
Medicine Refill Request    Last Office Visit: 11/14/2019  Last Telemedicine Visit: 7/1/2020 Mayank Acosta, DO    Next Office Visit: Visit date not found  Next Telemedicine Visit: Visit date not found         Current Outpatient Medications:   •  atorvastatin (LIPITOR) 20 mg tablet, Take 1 tablet (20 mg total) by mouth daily., Disp: 90 tablet, Rfl: 0  •  blood glucose control, normal solution, 1 Bottle as needed (new strips). For testing new strips Dx Code E11.65  NPI [id number], Disp: 1 each, Rfl: 0  •  blood sugar diagnostic strip, 1 strip daily. For use with meter to check blood glucose testing once a day dx code E11.65  NPI # [id number], Disp: 100 strip, Rfl: 1  •  blood-glucose meter kit, Use as instructed for testing blood glucose  Once a day.  Dx Code E 11.65 NPI # [id number], Disp: 1 each, Rfl: 0  •  FLUZONE HIGH-DOSE 2019-20, PF, 180 mcg/0.5 mL syringe injection, , Disp: , Rfl:   •  glipiZIDE (GLUCOTROL) 10 mg tablet, Take 1 tablet (10 mg total) by mouth daily with breakfast., Disp: 90 tablet, Rfl: 0  •  lancets 33 gauge misc, 1 each daily. Testing blood sugar Dx Code E11.65   NPI # [id number], Disp: 100 each, Rfl: 1  •  lancing device misc, 1 Device daily. Dx code E11.65  Testing for Blood sugar  NpI # [id number], Disp: 1 each, Rfl: 0  •  lisinopriL (PRINIVIL) 2.5 mg tablet, Take 1 tablet (2.5 mg total) by mouth once daily., Disp: 90 tablet, Rfl: 0  •  metFORMIN (GLUCOPHAGE) 1,000 mg tablet, Take 1 tablet (1,000 mg total) by mouth 2 (two) times a day with meals., Disp: 60 tablet, Rfl: 1  •  metFORMIN (GLUCOPHAGE) 1,000 mg tablet, Take 1 tablet (1,000 mg total) by mouth 2 (two) times a day with meals., Disp: 180 tablet, Rfl: 0      BP Readings from Last 3 Encounters:   11/14/19 130/74   10/12/18 130/72   05/11/18 130/80       Recent Lab results:  Lab Results   Component Value Date    CHOL 231 (H) 04/26/2019   ,   Lab Results   Component Value Date    HDL 50 04/26/2019   ,   Lab Results   Component Value  Date    LDLCALC 159 (H) 04/26/2019   ,   Lab Results   Component Value Date    TRIG 108 04/26/2019        Lab Results   Component Value Date    GLUCOSE 161 (H) 04/26/2019   ,   Lab Results   Component Value Date    HGBA1C 7.2 (H) 07/02/2020         Lab Results   Component Value Date    CREATININE 1.12 07/10/2019       Lab Results   Component Value Date    TSH 0.82 05/04/2018

## 2020-09-17 ENCOUNTER — HOSPITAL ENCOUNTER (OUTPATIENT)
Dept: SLEEP MEDICINE | Facility: HOSPITAL | Age: 65
Discharge: HOME | End: 2020-09-17
Attending: INTERNAL MEDICINE
Payer: COMMERCIAL

## 2020-09-17 DIAGNOSIS — G47.33 OSA (OBSTRUCTIVE SLEEP APNEA): ICD-10-CM

## 2020-09-17 PROCEDURE — G0399 HOME SLEEP TEST/TYPE 3 PORTA: HCPCS

## 2020-09-22 NOTE — PROGRESS NOTES
Sleep Study Note    Venu Phillips is a 65 y.o. male    There were no vitals filed for this visit.    Orders Placed This Encounter   Procedures   • Home sleep test       Night 1  Study signal quality:Adequate  AHI:38.7  ALEX SAO2:83%  Additional comments:    Night 2 (if applicable)  AHI:  Alex SAO2:  Additional comments:    Peter Tolentino  09/22/20 11:32 AM

## 2020-10-30 NOTE — TELEPHONE ENCOUNTER
Pt would like a call back regarding an rx he states that is for Blood pressure. Which Pt states he should have gotten medication for diabetes. Pt wife did not have rx names but wanted a call back regarding both meds.. Pt wife # [phone]

## 2020-11-02 ENCOUNTER — TELEPHONE (OUTPATIENT)
Dept: FAMILY MEDICINE | Facility: CLINIC | Age: 65
End: 2020-11-02

## 2020-11-02 RX ORDER — LISINOPRIL 2.5 MG/1
2.5 TABLET ORAL
Qty: 90 TABLET | Refills: 0 | Status: SHIPPED | OUTPATIENT
Start: 2020-11-02 | End: 2021-01-25

## 2020-11-02 RX ORDER — METFORMIN HYDROCHLORIDE 1000 MG/1
1000 TABLET ORAL 2 TIMES DAILY WITH MEALS
Qty: 180 TABLET | Refills: 0 | Status: SHIPPED | OUTPATIENT
Start: 2020-11-02 | End: 2021-01-25

## 2020-11-02 RX ORDER — GLIPIZIDE 10 MG/1
10 TABLET ORAL
Qty: 90 TABLET | Refills: 0 | Status: SHIPPED | OUTPATIENT
Start: 2020-11-02 | End: 2021-02-01

## 2020-11-02 NOTE — TELEPHONE ENCOUNTER
Patient said that he was prescribed bp medicaiton but he was never on bp meds. Patient would like to speak with you in regards to if he should or shouldn't be on that medication. He can be reached at [phone]

## 2020-11-02 NOTE — TELEPHONE ENCOUNTER
Raimundo wife called yeison want to talk to you about why raimundo is on blood pressure medication ?

## 2020-11-02 NOTE — TELEPHONE ENCOUNTER
Medicine Refill Request    Last Office Visit: 11/14/2019  Last Telemedicine Visit: 7/1/2020 Mayank Acosta, DO    Next Office Visit: Visit date not found  Next Telemedicine Visit: Visit date not found         Current Outpatient Medications:   •  atorvastatin (LIPITOR) 20 mg tablet, Take 1 tablet (20 mg total) by mouth daily., Disp: 90 tablet, Rfl: 0  •  blood glucose control, normal solution, 1 Bottle as needed (new strips). For testing new strips Dx Code E11.65  NPI [id number], Disp: 1 each, Rfl: 0  •  blood sugar diagnostic strip, 1 strip daily. For use with meter to check blood glucose testing once a day dx code E11.65  NPI # [id number], Disp: 100 strip, Rfl: 1  •  blood-glucose meter kit, Use as instructed for testing blood glucose  Once a day.  Dx Code E 11.65 NPI # [id number], Disp: 1 each, Rfl: 0  •  FLUZONE HIGH-DOSE 2019-20, PF, 180 mcg/0.5 mL syringe injection, , Disp: , Rfl:   •  glipiZIDE (GLUCOTROL) 10 mg tablet, Take 1 tablet (10 mg total) by mouth daily with breakfast., Disp: 90 tablet, Rfl: 1  •  lancets 33 gauge misc, 1 each daily. Testing blood sugar Dx Code E11.65   NPI # [id number], Disp: 100 each, Rfl: 1  •  lancing device misc, 1 Device daily. Dx code E11.65  Testing for Blood sugar  NpI # [id number], Disp: 1 each, Rfl: 0  •  lisinopriL (PRINIVIL) 2.5 mg tablet, Take 1 tablet (2.5 mg total) by mouth once daily., Disp: 90 tablet, Rfl: 1  •  metFORMIN (GLUCOPHAGE) 1,000 mg tablet, Take 1 tablet (1,000 mg total) by mouth 2 (two) times a day with meals., Disp: 60 tablet, Rfl: 1  •  metFORMIN (GLUCOPHAGE) 1,000 mg tablet, Take 1 tablet (1,000 mg total) by mouth 2 (two) times a day with meals., Disp: 180 tablet, Rfl: 0      BP Readings from Last 3 Encounters:   11/14/19 130/74   10/12/18 130/72   05/11/18 130/80       Recent Lab results:  Lab Results   Component Value Date    CHOL 231 (H) 04/26/2019   ,   Lab Results   Component Value Date    HDL 50 04/26/2019   ,   Lab Results   Component Value  Date    LDLCALC 159 (H) 04/26/2019   ,   Lab Results   Component Value Date    TRIG 108 04/26/2019        Lab Results   Component Value Date    GLUCOSE 161 (H) 04/26/2019   ,   Lab Results   Component Value Date    HGBA1C 7.2 (H) 07/02/2020         Lab Results   Component Value Date    CREATININE 1.12 07/10/2019       Lab Results   Component Value Date    TSH 0.82 05/04/2018

## 2020-11-07 NOTE — TELEPHONE ENCOUNTER
Spoke with patient's wife who will relay to patient.  He is on lisinopril 2.5 mg for kidney protection.  Even though he has never had hypertension.  I explained to him that it is for protection of the kidneys when a person has diabetes.  He is not having any side effects he has been on it for several years.  Patient's wife says he may opt to stop it.  I recommended that he continue on it.  However if he chooses to stop it we will just follow his labs and make adjustments as necessary.

## 2021-01-25 RX ORDER — METFORMIN HYDROCHLORIDE 1000 MG/1
1000 TABLET ORAL 2 TIMES DAILY WITH MEALS
Qty: 180 TABLET | Refills: 1 | Status: SHIPPED | OUTPATIENT
Start: 2021-01-25 | End: 2021-09-26 | Stop reason: SDUPTHER

## 2021-01-25 RX ORDER — LISINOPRIL 2.5 MG/1
2.5 TABLET ORAL
Qty: 90 TABLET | Refills: 1 | Status: SHIPPED | OUTPATIENT
Start: 2021-01-25 | End: 2021-07-23

## 2021-01-25 NOTE — TELEPHONE ENCOUNTER
Medicine Refill Request    Last Office Visit: 11/14/2019  Last Telemedicine Visit: 7/1/2020 Mayank Acosta, DO    Next Office Visit: Visit date not found  Next Telemedicine Visit: Visit date not found   Lm needs appt       Current Outpatient Medications:   •  atorvastatin (LIPITOR) 20 mg tablet, Take 1 tablet (20 mg total) by mouth daily., Disp: 90 tablet, Rfl: 0  •  blood glucose control, normal solution, 1 Bottle as needed (new strips). For testing new strips Dx Code E11.65  NPI [id number], Disp: 1 each, Rfl: 0  •  blood sugar diagnostic strip, 1 strip daily. For use with meter to check blood glucose testing once a day dx code E11.65  NPI # [id number], Disp: 100 strip, Rfl: 1  •  blood-glucose meter kit, Use as instructed for testing blood glucose  Once a day.  Dx Code E 11.65 NPI # [id number], Disp: 1 each, Rfl: 0  •  FLUZONE HIGH-DOSE 2019-20, PF, 180 mcg/0.5 mL syringe injection, , Disp: , Rfl:   •  glipiZIDE (GLUCOTROL) 10 mg tablet, Take 1 tablet (10 mg total) by mouth daily with breakfast., Disp: 90 tablet, Rfl: 0  •  lancets 33 gauge misc, 1 each daily. Testing blood sugar Dx Code E11.65   NPI # [id number], Disp: 100 each, Rfl: 1  •  lancing device misc, 1 Device daily. Dx code E11.65  Testing for Blood sugar  NpI # [id number], Disp: 1 each, Rfl: 0  •  lisinopriL (PRINIVIL) 2.5 mg tablet, Take 1 tablet (2.5 mg total) by mouth once daily., Disp: 90 tablet, Rfl: 0  •  metFORMIN (GLUCOPHAGE) 1,000 mg tablet, Take 1 tablet (1,000 mg total) by mouth 2 (two) times a day with meals., Disp: 60 tablet, Rfl: 1  •  metFORMIN (GLUCOPHAGE) 1,000 mg tablet, Take 1 tablet (1,000 mg total) by mouth 2 (two) times a day with meals., Disp: 180 tablet, Rfl: 0      BP Readings from Last 3 Encounters:   11/14/19 130/74   10/12/18 130/72   05/11/18 130/80       Recent Lab results:  Lab Results   Component Value Date    CHOL 231 (H) 04/26/2019   ,   Lab Results   Component Value Date    HDL 50 04/26/2019   ,   Lab Results    Component Value Date    LDLCALC 159 (H) 04/26/2019   ,   Lab Results   Component Value Date    TRIG 108 04/26/2019        Lab Results   Component Value Date    GLUCOSE 161 (H) 04/26/2019   ,   Lab Results   Component Value Date    HGBA1C 7.2 (H) 07/02/2020         Lab Results   Component Value Date    CREATININE 1.12 07/10/2019       Lab Results   Component Value Date    TSH 0.82 05/04/2018

## 2021-02-01 RX ORDER — GLIPIZIDE 10 MG/1
10 TABLET ORAL
Qty: 90 TABLET | Refills: 1 | Status: SHIPPED | OUTPATIENT
Start: 2021-02-01 | End: 2021-07-23

## 2021-02-01 NOTE — TELEPHONE ENCOUNTER
Medicine Refill Request    Last Office Visit: 11/14/2019  Last Telemedicine Visit: 7/1/2020 Mayank Acosta, DO    Next Office Visit: Visit date not found  Next Telemedicine Visit: Visit date not found         Current Outpatient Medications:   •  atorvastatin (LIPITOR) 20 mg tablet, Take 1 tablet (20 mg total) by mouth daily., Disp: 90 tablet, Rfl: 0  •  blood glucose control, normal solution, 1 Bottle as needed (new strips). For testing new strips Dx Code E11.65  NPI [id number], Disp: 1 each, Rfl: 0  •  blood sugar diagnostic strip, 1 strip daily. For use with meter to check blood glucose testing once a day dx code E11.65  NPI # [id number], Disp: 100 strip, Rfl: 1  •  blood-glucose meter kit, Use as instructed for testing blood glucose  Once a day.  Dx Code E 11.65 NPI # [id number], Disp: 1 each, Rfl: 0  •  FLUZONE HIGH-DOSE 2019-20, PF, 180 mcg/0.5 mL syringe injection, , Disp: , Rfl:   •  glipiZIDE (GLUCOTROL) 10 mg tablet, Take 1 tablet (10 mg total) by mouth daily with breakfast., Disp: 90 tablet, Rfl: 0  •  lancets 33 gauge misc, 1 each daily. Testing blood sugar Dx Code E11.65   NPI # [id number], Disp: 100 each, Rfl: 1  •  lancing device misc, 1 Device daily. Dx code E11.65  Testing for Blood sugar  NpI # [id number], Disp: 1 each, Rfl: 0  •  lisinopriL (PRINIVIL) 2.5 mg tablet, Take 1 tablet (2.5 mg total) by mouth once daily., Disp: 90 tablet, Rfl: 1  •  metFORMIN (GLUCOPHAGE) 1,000 mg tablet, Take 1 tablet (1,000 mg total) by mouth 2 (two) times a day with meals., Disp: 180 tablet, Rfl: 1      BP Readings from Last 3 Encounters:   11/14/19 130/74   10/12/18 130/72   05/11/18 130/80       Recent Lab results:  Lab Results   Component Value Date    CHOL 231 (H) 04/26/2019   ,   Lab Results   Component Value Date    HDL 50 04/26/2019   ,   Lab Results   Component Value Date    LDLCALC 159 (H) 04/26/2019   ,   Lab Results   Component Value Date    TRIG 108 04/26/2019        Lab Results   Component Value Date     GLUCOSE 161 (H) 04/26/2019   ,   Lab Results   Component Value Date    HGBA1C 7.2 (H) 07/02/2020         Lab Results   Component Value Date    CREATININE 1.12 07/10/2019       Lab Results   Component Value Date    TSH 0.82 05/04/2018

## 2021-07-23 RX ORDER — LISINOPRIL 2.5 MG/1
2.5 TABLET ORAL
Qty: 30 TABLET | Refills: 1 | Status: SHIPPED | OUTPATIENT
Start: 2021-07-23 | End: 2021-08-27

## 2021-07-23 RX ORDER — GLIPIZIDE 10 MG/1
10 TABLET ORAL
Qty: 30 TABLET | Refills: 1 | Status: SHIPPED | OUTPATIENT
Start: 2021-07-23 | End: 2021-08-27

## 2021-07-23 NOTE — TELEPHONE ENCOUNTER
Medicine Refill Request    Last Office Visit: 11/14/2019  Last Telemedicine Visit: 7/1/2020 Mayank Acosta, DO    Next Office Visit: Visit date not found  Next Telemedicine Visit: Visit date not found         Current Outpatient Medications:   •  atorvastatin (LIPITOR) 20 mg tablet, Take 1 tablet (20 mg total) by mouth daily., Disp: 90 tablet, Rfl: 0  •  blood glucose control, normal solution, 1 Bottle as needed (new strips). For testing new strips Dx Code E11.65  NPI [id number], Disp: 1 each, Rfl: 0  •  blood sugar diagnostic strip, 1 strip daily. For use with meter to check blood glucose testing once a day dx code E11.65  NPI # [id number], Disp: 100 strip, Rfl: 1  •  blood-glucose meter kit, Use as instructed for testing blood glucose  Once a day.  Dx Code E 11.65 NPI # [id number], Disp: 1 each, Rfl: 0  •  FLUZONE HIGH-DOSE 2019-20, PF, 180 mcg/0.5 mL syringe injection, , Disp: , Rfl:   •  glipiZIDE (GLUCOTROL) 10 mg tablet, TAKE 1 TABLET (10 MG TOTAL) BY MOUTH DAILY WITH BREAKFAST., Disp: 90 tablet, Rfl: 1  •  lancets 33 gauge misc, 1 each daily. Testing blood sugar Dx Code E11.65   NPI # [id number], Disp: 100 each, Rfl: 1  •  lancing device misc, 1 Device daily. Dx code E11.65  Testing for Blood sugar  NpI # [id number], Disp: 1 each, Rfl: 0  •  lisinopriL (PRINIVIL) 2.5 mg tablet, Take 1 tablet (2.5 mg total) by mouth once daily., Disp: 90 tablet, Rfl: 1  •  metFORMIN (GLUCOPHAGE) 1,000 mg tablet, Take 1 tablet (1,000 mg total) by mouth 2 (two) times a day with meals., Disp: 180 tablet, Rfl: 1      BP Readings from Last 3 Encounters:   11/14/19 130/74   10/12/18 130/72   05/11/18 130/80       Recent Lab results:  Lab Results   Component Value Date    CHOL 231 (H) 04/26/2019   ,   Lab Results   Component Value Date    HDL 50 04/26/2019   ,   Lab Results   Component Value Date    LDLCALC 159 (H) 04/26/2019   ,   Lab Results   Component Value Date    TRIG 108 04/26/2019        Lab Results   Component Value Date     GLUCOSE 161 (H) 04/26/2019   ,   Lab Results   Component Value Date    HGBA1C 7.2 (H) 07/02/2020         Lab Results   Component Value Date    CREATININE 1.12 07/10/2019       Lab Results   Component Value Date    TSH 0.82 05/04/2018

## 2021-08-25 ENCOUNTER — TELEPHONE (OUTPATIENT)
Dept: FAMILY MEDICINE | Facility: CLINIC | Age: 66
End: 2021-08-25

## 2021-08-25 DIAGNOSIS — E78.2 DM TYPE 2 WITH DIABETIC MIXED HYPERLIPIDEMIA (CMS/HCC)  (CMS/HCC): Primary | ICD-10-CM

## 2021-08-25 DIAGNOSIS — E11.69 DM TYPE 2 WITH DIABETIC MIXED HYPERLIPIDEMIA (CMS/HCC)  (CMS/HCC): Primary | ICD-10-CM

## 2021-08-25 DIAGNOSIS — Z13.9 SCREENING FOR CONDITION: ICD-10-CM

## 2021-08-25 NOTE — TELEPHONE ENCOUNTER
Patient has an appt on 8/27 and he would like to go get blood work done prior to appt. Patient can be reached at [phone]

## 2021-08-27 RX ORDER — GLIPIZIDE 10 MG/1
10 TABLET ORAL
Qty: 30 TABLET | Refills: 1 | Status: SHIPPED | OUTPATIENT
Start: 2021-08-27 | End: 2021-09-20

## 2021-08-27 RX ORDER — LISINOPRIL 2.5 MG/1
2.5 TABLET ORAL
Qty: 30 TABLET | Refills: 1 | Status: SHIPPED | OUTPATIENT
Start: 2021-08-27 | End: 2021-09-20

## 2021-08-27 NOTE — TELEPHONE ENCOUNTER
Medicine Refill Request    Last Office Visit: 11/14/2019  Last Telemedicine Visit: 7/1/2020 Mayank Acosta DO    Next Office Visit: 9/23/2021  Next Telemedicine Visit: Visit date not found         Current Outpatient Medications:   •  atorvastatin (LIPITOR) 20 mg tablet, Take 1 tablet (20 mg total) by mouth daily., Disp: 90 tablet, Rfl: 0  •  blood glucose control, normal solution, 1 Bottle as needed (new strips). For testing new strips Dx Code E11.65  NPI [id number], Disp: 1 each, Rfl: 0  •  blood sugar diagnostic strip, 1 strip daily. For use with meter to check blood glucose testing once a day dx code E11.65  NPI # [id number], Disp: 100 strip, Rfl: 1  •  blood-glucose meter kit, Use as instructed for testing blood glucose  Once a day.  Dx Code E 11.65 NPI # [id number], Disp: 1 each, Rfl: 0  •  FLUZONE HIGH-DOSE 2019-20, PF, 180 mcg/0.5 mL syringe injection, , Disp: , Rfl:   •  glipiZIDE (GLUCOTROL) 10 mg tablet, Take 1 tablet (10 mg total) by mouth daily with breakfast., Disp: 30 tablet, Rfl: 1  •  lancets 33 gauge misc, 1 each daily. Testing blood sugar Dx Code E11.65   NPI # [id number], Disp: 100 each, Rfl: 1  •  lancing device misc, 1 Device daily. Dx code E11.65  Testing for Blood sugar  NpI # [id number], Disp: 1 each, Rfl: 0  •  lisinopriL (PRINIVIL) 2.5 mg tablet, Take 1 tablet (2.5 mg total) by mouth once daily., Disp: 30 tablet, Rfl: 1  •  metFORMIN (GLUCOPHAGE) 1,000 mg tablet, Take 1 tablet (1,000 mg total) by mouth 2 (two) times a day with meals., Disp: 180 tablet, Rfl: 1      BP Readings from Last 3 Encounters:   11/14/19 130/74   10/12/18 130/72   05/11/18 130/80       Recent Lab results:  Lab Results   Component Value Date    CHOL 231 (H) 04/26/2019   ,   Lab Results   Component Value Date    HDL 50 04/26/2019   ,   Lab Results   Component Value Date    LDLCALC 159 (H) 04/26/2019   ,   Lab Results   Component Value Date    TRIG 108 04/26/2019        Lab Results   Component Value Date    GLUCOSE  161 (H) 04/26/2019   ,   Lab Results   Component Value Date    HGBA1C 7.2 (H) 07/02/2020         Lab Results   Component Value Date    CREATININE 1.12 07/10/2019       Lab Results   Component Value Date    TSH 0.82 05/04/2018

## 2021-08-28 LAB
ALBUMIN SERPL-MCNC: 4.1 G/DL (ref 3.6–5.1)
ALBUMIN/CREAT UR: 22 MCG/MG CREAT
ALBUMIN/GLOB SERPL: 1.4 (CALC) (ref 1–2.5)
ALP SERPL-CCNC: 70 U/L (ref 35–144)
ALT SERPL-CCNC: 16 U/L (ref 9–46)
AST SERPL-CCNC: 22 U/L (ref 10–35)
BASOPHILS # BLD AUTO: 28 CELLS/UL (ref 0–200)
BASOPHILS NFR BLD AUTO: 0.4 %
BILIRUB SERPL-MCNC: 0.7 MG/DL (ref 0.2–1.2)
BUN SERPL-MCNC: 18 MG/DL (ref 7–25)
BUN/CREAT SERPL: ABNORMAL (CALC) (ref 6–22)
CALCIUM SERPL-MCNC: 9 MG/DL (ref 8.6–10.3)
CHLORIDE SERPL-SCNC: 103 MMOL/L (ref 98–110)
CHOLEST SERPL-MCNC: 198 MG/DL
CHOLEST/HDLC SERPL: 3.7 (CALC)
CO2 SERPL-SCNC: 27 MMOL/L (ref 20–32)
CREAT SERPL-MCNC: 1.07 MG/DL (ref 0.7–1.25)
CREAT UR-MCNC: 185 MG/DL (ref 20–320)
EOSINOPHIL # BLD AUTO: 186 CELLS/UL (ref 15–500)
EOSINOPHIL NFR BLD AUTO: 2.7 %
ERYTHROCYTE [DISTWIDTH] IN BLOOD BY AUTOMATED COUNT: 13.2 % (ref 11–15)
GLOBULIN SER CALC-MCNC: 3 G/DL (CALC) (ref 1.9–3.7)
GLUCOSE SERPL-MCNC: 153 MG/DL (ref 65–99)
HBA1C MFR BLD: 6.5 % OF TOTAL HGB
HCT VFR BLD AUTO: 41.3 % (ref 38.5–50)
HDLC SERPL-MCNC: 53 MG/DL
HGB BLD-MCNC: 13.7 G/DL (ref 13.2–17.1)
LDLC SERPL CALC-MCNC: 125 MG/DL (CALC)
LYMPHOCYTES # BLD AUTO: 2463 CELLS/UL (ref 850–3900)
LYMPHOCYTES NFR BLD AUTO: 35.7 %
MCH RBC QN AUTO: 28.7 PG (ref 27–33)
MCHC RBC AUTO-ENTMCNC: 33.2 G/DL (ref 32–36)
MCV RBC AUTO: 86.6 FL (ref 80–100)
MICROALBUMIN UR-MCNC: 4 MG/DL
MONOCYTES # BLD AUTO: 600 CELLS/UL (ref 200–950)
MONOCYTES NFR BLD AUTO: 8.7 %
NEUTROPHILS # BLD AUTO: 3623 CELLS/UL (ref 1500–7800)
NEUTROPHILS NFR BLD AUTO: 52.5 %
NONHDLC SERPL-MCNC: 145 MG/DL (CALC)
PLATELET # BLD AUTO: 232 THOUSAND/UL (ref 140–400)
PMV BLD REES-ECKER: 10.3 FL (ref 7.5–12.5)
POTASSIUM SERPL-SCNC: 4.7 MMOL/L (ref 3.5–5.3)
PROT SERPL-MCNC: 7.1 G/DL (ref 6.1–8.1)
QUEST EGFR AFRICAN AMERICAN: 82 ML/MIN/1.73M2
QUEST EGFR NON-AFR. AMERICAN: 71 ML/MIN/1.73M2
RBC # BLD AUTO: 4.77 MILLION/UL (ref 4.2–5.8)
SODIUM SERPL-SCNC: 137 MMOL/L (ref 135–146)
TRIGL SERPL-MCNC: 97 MG/DL
TSH SERPL-ACNC: 1.12 MIU/L (ref 0.4–4.5)
WBC # BLD AUTO: 6.9 THOUSAND/UL (ref 3.8–10.8)

## 2021-09-23 ENCOUNTER — OFFICE VISIT (OUTPATIENT)
Dept: FAMILY MEDICINE | Facility: CLINIC | Age: 66
End: 2021-09-23
Payer: COMMERCIAL

## 2021-09-23 VITALS
DIASTOLIC BLOOD PRESSURE: 72 MMHG | HEIGHT: 71 IN | SYSTOLIC BLOOD PRESSURE: 130 MMHG | RESPIRATION RATE: 16 BRPM | BODY MASS INDEX: 30.24 KG/M2 | HEART RATE: 80 BPM | TEMPERATURE: 96.8 F | WEIGHT: 216 LBS | OXYGEN SATURATION: 98 %

## 2021-09-23 DIAGNOSIS — E11.69 DM TYPE 2 WITH DIABETIC MIXED HYPERLIPIDEMIA (CMS/HCC)  (CMS/HCC): ICD-10-CM

## 2021-09-23 DIAGNOSIS — E78.2 DM TYPE 2 WITH DIABETIC MIXED HYPERLIPIDEMIA (CMS/HCC)  (CMS/HCC): ICD-10-CM

## 2021-09-23 DIAGNOSIS — E78.2 MIXED HYPERLIPIDEMIA: ICD-10-CM

## 2021-09-23 DIAGNOSIS — Z12.11 COLON CANCER SCREENING: ICD-10-CM

## 2021-09-23 DIAGNOSIS — Z00.01 ENCOUNTER FOR GENERAL ADULT MEDICAL EXAMINATION WITH ABNORMAL FINDINGS: Primary | ICD-10-CM

## 2021-09-23 PROCEDURE — 3075F SYST BP GE 130 - 139MM HG: CPT | Performed by: INTERNAL MEDICINE

## 2021-09-23 PROCEDURE — 90471 IMMUNIZATION ADMIN: CPT | Performed by: INTERNAL MEDICINE

## 2021-09-23 PROCEDURE — 99397 PER PM REEVAL EST PAT 65+ YR: CPT | Mod: 25 | Performed by: INTERNAL MEDICINE

## 2021-09-23 PROCEDURE — 3078F DIAST BP <80 MM HG: CPT | Performed by: INTERNAL MEDICINE

## 2021-09-23 PROCEDURE — 90694 VACC AIIV4 NO PRSRV 0.5ML IM: CPT | Performed by: INTERNAL MEDICINE

## 2021-09-23 PROCEDURE — 3008F BODY MASS INDEX DOCD: CPT | Performed by: INTERNAL MEDICINE

## 2021-09-23 ASSESSMENT — PATIENT HEALTH QUESTIONNAIRE - PHQ9: SUM OF ALL RESPONSES TO PHQ9 QUESTIONS 1 & 2: 0

## 2021-09-26 PROBLEM — Z12.11 COLON CANCER SCREENING: Status: ACTIVE | Noted: 2021-09-26

## 2021-09-26 PROBLEM — Z00.01 ENCOUNTER FOR GENERAL ADULT MEDICAL EXAMINATION WITH ABNORMAL FINDINGS: Status: ACTIVE | Noted: 2021-09-26

## 2021-09-26 RX ORDER — METFORMIN HYDROCHLORIDE 1000 MG/1
1000 TABLET ORAL
Qty: 90 TABLET | Refills: 1 | Status: SHIPPED | OUTPATIENT
Start: 2021-09-26 | End: 2022-07-24 | Stop reason: SDUPTHER

## 2021-09-26 ASSESSMENT — ENCOUNTER SYMPTOMS
CARDIOVASCULAR NEGATIVE: 1
HEMATOLOGIC/LYMPHATIC NEGATIVE: 1
RESPIRATORY NEGATIVE: 1
NEUROLOGICAL NEGATIVE: 1
EYES NEGATIVE: 1
PSYCHIATRIC NEGATIVE: 1
CONSTITUTIONAL NEGATIVE: 1
ALLERGIC/IMMUNOLOGIC NEGATIVE: 1
GASTROINTESTINAL NEGATIVE: 1
ENDOCRINE NEGATIVE: 1
MUSCULOSKELETAL NEGATIVE: 1

## 2021-09-26 NOTE — PROGRESS NOTES
"  Subjective     Patient ID: Venu Phillips is a 66 y.o. male.    HPI here for annual physical history of diabetes, and hyperlipidemia.    Review of Systems   Constitutional: Negative.    HENT: Negative.    Eyes: Negative.    Respiratory: Negative.    Cardiovascular: Negative.    Gastrointestinal: Negative.    Endocrine: Negative.    Genitourinary: Negative.    Musculoskeletal: Negative.    Skin: Negative.    Allergic/Immunologic: Negative.    Neurological: Negative.    Hematological: Negative.    Psychiatric/Behavioral: Negative.        Objective     Vitals:    09/23/21 1449   BP: 130/72   BP Location: Left upper arm   Patient Position: Sitting   Pulse: 80   Resp: 16   Temp: (!) 36 °C (96.8 °F)   TempSrc: Temporal   SpO2: 98%   Weight: 98 kg (216 lb)   Height: 1.803 m (5' 11\")     Body mass index is 30.13 kg/m².    Physical Exam  Vitals and nursing note reviewed.   Constitutional:       Appearance: He is well-developed.   HENT:      Head: Normocephalic and atraumatic.      Right Ear: External ear normal.      Left Ear: External ear normal.      Nose: Nose normal.   Eyes:      Conjunctiva/sclera: Conjunctivae normal.      Pupils: Pupils are equal, round, and reactive to light.   Cardiovascular:      Rate and Rhythm: Normal rate and regular rhythm.      Pulses: Normal pulses.      Heart sounds: Normal heart sounds.   Pulmonary:      Effort: Pulmonary effort is normal.      Breath sounds: Normal breath sounds.   Abdominal:      General: Bowel sounds are normal.      Palpations: Abdomen is soft.   Musculoskeletal:         General: Normal range of motion.      Cervical back: Normal range of motion and neck supple.   Skin:     General: Skin is warm and dry.      Capillary Refill: Capillary refill takes less than 2 seconds.   Neurological:      General: No focal deficit present.      Mental Status: He is alert and oriented to person, place, and time.   Psychiatric:         Mood and Affect: Mood normal.         Behavior: " Behavior normal.         Thought Content: Thought content normal.         Judgment: Judgment normal.         Assessment/Plan   Diagnoses and all orders for this visit:    Encounter for general adult medical examination with abnormal findings (Primary)  Assessment & Plan:  Patient presents for annual physical.  He is independent in his activities of daily living.  He is encouraged to continue to increase his exercise.  And monitor his diet.  was colonoscopy is better today.  And he is getting the flu vaccine today.  He is up-to-date on all other screenings.      Colon cancer screening  Assessment & Plan:  Patient is due for colonoscopy.  Referral placed to [place] direct access.    Orders:  -     Direct Access Colonoscopy [facility]    DM type 2 with diabetic mixed hyperlipidemia (CMS/[facility])  Assessment & Plan:  Patient with a history of diabetes.  He has been taking at 1000 mg of Metformin once a day.  And glipizide 10 mg with breakfast.  Patient most recent blood work was 6.5.  Will change Metformin to once a day.       Mixed hyperlipidemia  Assessment & Plan:  Patient with a history of hyperlipidemia.  was on atorvastatin 20 mg.  Has not been on statin in some time.  LDL is still slightly elevated.  We will continue to monitor and repeat labs in 6 months.      Other orders  -     Influenza vaccine Quad Adjuvanted 65 and Older (FluAd Quad)

## 2021-09-26 NOTE — ASSESSMENT & PLAN NOTE
Patient presents for annual physical.  He is independent in his activities of daily living.  He is encouraged to continue to increase his exercise.  And monitor his diet.  was colonoscopy is better today.  And he is getting the flu vaccine today.  He is up-to-date on all other screenings.

## 2021-09-26 NOTE — ASSESSMENT & PLAN NOTE
Patient with a history of hyperlipidemia.  was on atorvastatin 20 mg.  Has not been on statin in some time.  LDL is still slightly elevated.  We will continue to monitor and repeat labs in 6 months.

## 2021-10-26 RX ORDER — LISINOPRIL 2.5 MG/1
2.5 TABLET ORAL
Qty: 30 TABLET | Refills: 1 | Status: SHIPPED | OUTPATIENT
Start: 2021-10-26 | End: 2021-11-22

## 2021-10-26 NOTE — TELEPHONE ENCOUNTER
Medicine Refill Request    Last Office Visit: 9/23/2021  Last Telemedicine Visit: 7/1/2020 Mayank Acosta,     Next Office Visit: Visit date not found  Next Telemedicine Visit: Visit date not found         Current Outpatient Medications:   •  atorvastatin (LIPITOR) 20 mg tablet, Take 1 tablet (20 mg total) by mouth daily., Disp: 90 tablet, Rfl: 0  •  blood glucose control, normal solution, 1 Bottle as needed (new strips). For testing new strips Dx Code E11.65  NPI [id number], Disp: 1 each, Rfl: 0  •  blood sugar diagnostic strip, 1 strip daily. For use with meter to check blood glucose testing once a day dx code E11.65  NPI # [id number], Disp: 100 strip, Rfl: 1  •  blood-glucose meter kit, Use as instructed for testing blood glucose  Once a day.  Dx Code E 11.65 NPI # [id number], Disp: 1 each, Rfl: 0  •  glipiZIDE (GLUCOTROL) 10 mg tablet, TAKE 1 TABLET (10 MG TOTAL) BY MOUTH DAILY WITH BREAKFAST., Disp: 30 tablet, Rfl: 1  •  lancets 33 gauge misc, 1 each daily. Testing blood sugar Dx Code E11.65   NPI # [id number], Disp: 100 each, Rfl: 1  •  lancing device misc, 1 Device daily. Dx code E11.65  Testing for Blood sugar  NpI # [id number], Disp: 1 each, Rfl: 0  •  lisinopriL (PRINIVIL) 2.5 mg tablet, TAKE 1 TABLET BY MOUTH EVERY DAY, Disp: 30 tablet, Rfl: 1  •  metFORMIN (GLUCOPHAGE) 1,000 mg tablet, Take 1 tablet (1,000 mg total) by mouth daily with breakfast., Disp: 90 tablet, Rfl: 1      BP Readings from Last 3 Encounters:   09/23/21 130/72   11/14/19 130/74   10/12/18 130/72       Recent Lab results:  Lab Results   Component Value Date    CHOL 198 08/27/2021   ,   Lab Results   Component Value Date    HDL 53 08/27/2021   ,   Lab Results   Component Value Date    LDLCALC 125 (H) 08/27/2021   ,   Lab Results   Component Value Date    TRIG 97 08/27/2021        Lab Results   Component Value Date    GLUCOSE 153 (H) 08/27/2021   ,   Lab Results   Component Value Date    HGBA1C 6.5 (H) 08/27/2021         Lab Results    Component Value Date    CREATININE 1.07 08/27/2021       Lab Results   Component Value Date    TSH 1.12 08/27/2021

## 2022-01-19 ENCOUNTER — TELEPHONE (OUTPATIENT)
Dept: FAMILY MEDICINE | Facility: CLINIC | Age: 67
End: 2022-01-19
Payer: COMMERCIAL

## 2022-06-06 NOTE — ADDENDUM NOTE
Addended by: MILTON WAKEFIELD on: 10/12/2018 10:20 AM     Modules accepted: Orders     Call to pt. Left message to establish with PCP. Monitor for bleeding. Avoid Methotrexate for now.

## 2022-07-14 ENCOUNTER — OFFICE VISIT (OUTPATIENT)
Dept: FAMILY MEDICINE | Facility: CLINIC | Age: 67
End: 2022-07-14
Payer: COMMERCIAL

## 2022-07-14 VITALS
OXYGEN SATURATION: 97 % | HEART RATE: 69 BPM | HEIGHT: 71 IN | WEIGHT: 216 LBS | DIASTOLIC BLOOD PRESSURE: 70 MMHG | RESPIRATION RATE: 16 BRPM | BODY MASS INDEX: 30.24 KG/M2 | TEMPERATURE: 98.6 F | SYSTOLIC BLOOD PRESSURE: 130 MMHG

## 2022-07-14 DIAGNOSIS — E11.69 DM TYPE 2 WITH DIABETIC MIXED HYPERLIPIDEMIA (CMS/HCC)  (CMS/HCC): ICD-10-CM

## 2022-07-14 DIAGNOSIS — E78.2 DM TYPE 2 WITH DIABETIC MIXED HYPERLIPIDEMIA (CMS/HCC)  (CMS/HCC): ICD-10-CM

## 2022-07-14 PROCEDURE — 3075F SYST BP GE 130 - 139MM HG: CPT | Performed by: INTERNAL MEDICINE

## 2022-07-14 PROCEDURE — 3078F DIAST BP <80 MM HG: CPT | Performed by: INTERNAL MEDICINE

## 2022-07-14 PROCEDURE — 3008F BODY MASS INDEX DOCD: CPT | Performed by: INTERNAL MEDICINE

## 2022-07-14 PROCEDURE — 99213 OFFICE O/P EST LOW 20 MIN: CPT | Performed by: INTERNAL MEDICINE

## 2022-07-14 ASSESSMENT — PATIENT HEALTH QUESTIONNAIRE - PHQ9: SUM OF ALL RESPONSES TO PHQ9 QUESTIONS 1 & 2: 0

## 2022-07-24 RX ORDER — LISINOPRIL 2.5 MG/1
2.5 TABLET ORAL
Qty: 90 TABLET | Refills: 1 | Status: SHIPPED | OUTPATIENT
Start: 2022-07-24 | End: 2023-04-05 | Stop reason: SDUPTHER

## 2022-07-24 RX ORDER — METFORMIN HYDROCHLORIDE 1000 MG/1
1000 TABLET ORAL
Qty: 90 TABLET | Refills: 1 | Status: SHIPPED | OUTPATIENT
Start: 2022-07-24 | End: 2022-10-25

## 2022-07-24 ASSESSMENT — ENCOUNTER SYMPTOMS
ENDOCRINE NEGATIVE: 1
HEMATOLOGIC/LYMPHATIC NEGATIVE: 1
EYES NEGATIVE: 1
GASTROINTESTINAL NEGATIVE: 1
PSYCHIATRIC NEGATIVE: 1
NEUROLOGICAL NEGATIVE: 1
RESPIRATORY NEGATIVE: 1
MUSCULOSKELETAL NEGATIVE: 1
CARDIOVASCULAR NEGATIVE: 1
ALLERGIC/IMMUNOLOGIC NEGATIVE: 1
CONSTITUTIONAL NEGATIVE: 1

## 2022-07-24 NOTE — ASSESSMENT & PLAN NOTE
His last hemoglobin A1c was 6.5.  At that time he was taken to 1000 mg of metformin daily and glipizide 10 mg daily with breakfast.  Patient is due for labs.  Patient does have a history of hypercholesterolemia.  Patient has not been taking his atorvastatin.  He feels everyone in his family has issues with it.  His total cholesterol is down to 189.  LDL is slightly elevated.

## 2022-07-24 NOTE — PROGRESS NOTES
"  Subjective     Patient ID: Venu Phillips is a 67 y.o. male.    HPI patient here for follow-up diabetes, hypercholesterolemia.  Review of Systems   Constitutional: Negative.    HENT: Negative.    Eyes: Negative.    Respiratory: Negative.    Cardiovascular: Negative.    Gastrointestinal: Negative.    Endocrine: Negative.    Genitourinary: Negative.    Musculoskeletal: Negative.    Skin: Negative.    Allergic/Immunologic: Negative.    Neurological: Negative.    Hematological: Negative.    Psychiatric/Behavioral: Negative.        Objective     Vitals:    07/14/22 1529   BP: 130/70   BP Location: Left upper arm   Patient Position: Sitting   Pulse: 69   Resp: 16   Temp: 37 °C (98.6 °F)   TempSrc: Oral   SpO2: 97%   Weight: 98 kg (216 lb)   Height: 1.803 m (5' 11\")     Body mass index is 30.13 kg/m².    Physical Exam  Vitals and nursing note reviewed.   Constitutional:       Appearance: He is well-developed.   HENT:      Head: Normocephalic and atraumatic.      Right Ear: External ear normal.      Left Ear: External ear normal.      Nose: Nose normal.   Eyes:      Conjunctiva/sclera: Conjunctivae normal.      Pupils: Pupils are equal, round, and reactive to light.   Cardiovascular:      Rate and Rhythm: Normal rate and regular rhythm.      Pulses: Normal pulses.      Heart sounds: Normal heart sounds.   Pulmonary:      Effort: Pulmonary effort is normal.      Breath sounds: Normal breath sounds.   Abdominal:      General: Bowel sounds are normal.      Palpations: Abdomen is soft.   Musculoskeletal:         General: Normal range of motion.      Cervical back: Normal range of motion and neck supple.   Skin:     General: Skin is warm and dry.      Capillary Refill: Capillary refill takes less than 2 seconds.   Neurological:      General: No focal deficit present.      Mental Status: He is alert and oriented to person, place, and time.   Psychiatric:         Mood and Affect: Mood normal.         Behavior: Behavior normal.       "   Thought Content: Thought content normal.         Judgment: Judgment normal.         Assessment/Plan   Diagnoses and all orders for this visit:    DM type 2 with diabetic mixed hyperlipidemia (CMS/HCC)  Assessment & Plan:  His last hemoglobin A1c was 6.5.  At that time he was taken to 1000 mg of metformin daily and glipizide 10 mg daily with breakfast.  Patient is due for labs.  Patient does have a history of hypercholesterolemia.  Patient has not been taking his atorvastatin.  He feels everyone in his family has issues with it.  His total cholesterol is down to 189.  LDL is slightly elevated.    Orders:  -     CBC and Differential; Future  -     Comprehensive metabolic panel; Future  -     Lipid panel; Future  -     Microalbumin/Creatinine Ur Random; Future  -     Hemoglobin A1c; Future    Other orders  -     metFORMIN (GLUCOPHAGE) 1,000 mg tablet; Take 1 tablet (1,000 mg total) by mouth daily with breakfast.  -     lisinopriL (PRINIVIL) 2.5 mg tablet; Take 1 tablet (2.5 mg total) by mouth once daily.

## 2022-08-25 ENCOUNTER — PATIENT OUTREACH (OUTPATIENT)
Dept: FAMILY MEDICINE | Facility: CLINIC | Age: 67
End: 2022-08-25
Payer: COMMERCIAL

## 2022-08-25 NOTE — PROGRESS NOTES
Care Gap Team has outreached to Venu Phillips on behalf of their primary care provider.      Care Gap Source: [facility]    Care Gap(s) Identified: Diabetic Eye Exam    Care Gap Status: Due    Outreach via: Telephone    Adult Preventive Wellness Protocol(s) Used: Diabetic Retinopathy Screening    Chart Review Completed: Yes  Patient interview completed: No  Inclusion Criteria: Met  Exclusion Criteria: None  Patient educated on recommended care: No    Order or Clinical Referral: None          Appointment provided: No          Called and left VM for patient letting them know that they are due for their diabetic retinopathy screening.  Asked patient to return my call.

## 2022-09-30 LAB
ALBUMIN SERPL-MCNC: 4.1 G/DL (ref 3.6–5.1)
ALBUMIN/CREAT UR: 60 MCG/MG CREAT
ALBUMIN/GLOB SERPL: 1.3 (CALC) (ref 1–2.5)
ALP SERPL-CCNC: 77 U/L (ref 35–144)
ALT SERPL-CCNC: 20 U/L (ref 9–46)
AST SERPL-CCNC: 23 U/L (ref 10–35)
BASOPHILS # BLD AUTO: 18 CELLS/UL (ref 0–200)
BASOPHILS NFR BLD AUTO: 0.3 %
BILIRUB SERPL-MCNC: 0.6 MG/DL (ref 0.2–1.2)
BUN SERPL-MCNC: 17 MG/DL (ref 7–25)
BUN/CREAT SERPL: ABNORMAL (CALC) (ref 6–22)
CALCIUM SERPL-MCNC: 9.1 MG/DL (ref 8.6–10.3)
CHLORIDE SERPL-SCNC: 102 MMOL/L (ref 98–110)
CHOLEST SERPL-MCNC: 217 MG/DL
CHOLEST/HDLC SERPL: 3.9 (CALC)
CO2 SERPL-SCNC: 30 MMOL/L (ref 20–32)
CREAT SERPL-MCNC: 1 MG/DL (ref 0.7–1.35)
CREAT UR-MCNC: 78 MG/DL (ref 20–320)
EGFRCR SERPLBLD CKD-EPI 2021: 81 ML/MIN/1.73M2
EOSINOPHIL # BLD AUTO: 220 CELLS/UL (ref 15–500)
EOSINOPHIL NFR BLD AUTO: 3.6 %
ERYTHROCYTE [DISTWIDTH] IN BLOOD BY AUTOMATED COUNT: 13.1 % (ref 11–15)
GLOBULIN SER CALC-MCNC: 3.2 G/DL (CALC) (ref 1.9–3.7)
GLUCOSE SERPL-MCNC: 148 MG/DL (ref 65–99)
HBA1C MFR BLD: 7 % OF TOTAL HGB
HCT VFR BLD AUTO: 43.1 % (ref 38.5–50)
HDLC SERPL-MCNC: 56 MG/DL
HGB BLD-MCNC: 14.4 G/DL (ref 13.2–17.1)
LDLC SERPL CALC-MCNC: 137 MG/DL (CALC)
LYMPHOCYTES # BLD AUTO: 1964 CELLS/UL (ref 850–3900)
LYMPHOCYTES NFR BLD AUTO: 32.2 %
MCH RBC QN AUTO: 29 PG (ref 27–33)
MCHC RBC AUTO-ENTMCNC: 33.4 G/DL (ref 32–36)
MCV RBC AUTO: 86.7 FL (ref 80–100)
MICROALBUMIN UR-MCNC: 4.7 MG/DL
MONOCYTES # BLD AUTO: 586 CELLS/UL (ref 200–950)
MONOCYTES NFR BLD AUTO: 9.6 %
NEUTROPHILS # BLD AUTO: 3312 CELLS/UL (ref 1500–7800)
NEUTROPHILS NFR BLD AUTO: 54.3 %
NONHDLC SERPL-MCNC: 161 MG/DL (CALC)
PLATELET # BLD AUTO: 232 THOUSAND/UL (ref 140–400)
PMV BLD REES-ECKER: 10.2 FL (ref 7.5–12.5)
POTASSIUM SERPL-SCNC: 4.7 MMOL/L (ref 3.5–5.3)
PROT SERPL-MCNC: 7.3 G/DL (ref 6.1–8.1)
RBC # BLD AUTO: 4.97 MILLION/UL (ref 4.2–5.8)
SODIUM SERPL-SCNC: 137 MMOL/L (ref 135–146)
TRIGL SERPL-MCNC: 121 MG/DL
WBC # BLD AUTO: 6.1 THOUSAND/UL (ref 3.8–10.8)

## 2022-10-24 ENCOUNTER — TELEPHONE (OUTPATIENT)
Dept: FAMILY MEDICINE | Facility: CLINIC | Age: 67
End: 2022-10-24
Payer: COMMERCIAL

## 2022-10-24 DIAGNOSIS — E11.69 DM TYPE 2 WITH DIABETIC MIXED HYPERLIPIDEMIA (CMS/HCC)  (CMS/HCC): Primary | ICD-10-CM

## 2022-10-24 DIAGNOSIS — E78.2 MIXED HYPERLIPIDEMIA: ICD-10-CM

## 2022-10-24 DIAGNOSIS — E78.2 DM TYPE 2 WITH DIABETIC MIXED HYPERLIPIDEMIA (CMS/HCC)  (CMS/HCC): Primary | ICD-10-CM

## 2022-10-24 NOTE — TELEPHONE ENCOUNTER
Patient called and left a voicemail.  He is looking to discuss his blood work.     Please advise.

## 2022-10-24 NOTE — TELEPHONE ENCOUNTER
Left VM for patient. Discussed with Dr Ludwig. Ok to increase Metformin to 1000 mgs BID or 1000 mgs in AM, 500 mgs in PM. Patient to follow up in Dec, have CMP, lipids and A1C repeated.

## 2022-10-25 RX ORDER — METFORMIN HYDROCHLORIDE 1000 MG/1
1000 TABLET ORAL 2 TIMES DAILY WITH MEALS
Qty: 180 TABLET | Refills: 1 | Status: SHIPPED | OUTPATIENT
Start: 2022-10-25 | End: 2023-05-01 | Stop reason: SDUPTHER

## 2022-10-25 NOTE — TELEPHONE ENCOUNTER
Discussed with patient. Agreeable to Metformin increase. Discussed diet modifications. Mediterranean diet handout to be provided.

## 2022-12-12 ENCOUNTER — OFFICE VISIT (OUTPATIENT)
Dept: FAMILY MEDICINE | Facility: CLINIC | Age: 67
End: 2022-12-12
Payer: COMMERCIAL

## 2022-12-12 VITALS
SYSTOLIC BLOOD PRESSURE: 140 MMHG | BODY MASS INDEX: 30.66 KG/M2 | HEART RATE: 78 BPM | DIASTOLIC BLOOD PRESSURE: 70 MMHG | RESPIRATION RATE: 16 BRPM | OXYGEN SATURATION: 97 % | WEIGHT: 219 LBS | TEMPERATURE: 98.1 F | HEIGHT: 71 IN

## 2022-12-12 DIAGNOSIS — Z53.21 PATIENT LEFT WITHOUT BEING SEEN: Primary | ICD-10-CM

## 2022-12-12 PROCEDURE — 99999 PR OFFICE/OUTPT VISIT,PROCEDURE ONLY: CPT | Performed by: FAMILY MEDICINE

## 2022-12-12 ASSESSMENT — PATIENT HEALTH QUESTIONNAIRE - PHQ9: SUM OF ALL RESPONSES TO PHQ9 QUESTIONS 1 & 2: 0

## 2023-04-05 RX ORDER — LISINOPRIL 2.5 MG/1
2.5 TABLET ORAL
Qty: 90 TABLET | Refills: 0 | Status: SHIPPED | OUTPATIENT
Start: 2023-04-05 | End: 2023-05-01 | Stop reason: DRUGHIGH

## 2023-04-05 NOTE — TELEPHONE ENCOUNTER
Medicine Refill Request    Last Office: 12/12/2022   Last Consult Visit: Visit date not found  Last Telemedicine Visit: 7/1/2020 Mayank Acosta, DO    Next Appointment: Visit date not found      Current Outpatient Medications:   •  blood glucose control, normal solution, 1 Bottle as needed (new strips). For testing new strips Dx Code E11.65  NPI [id number], Disp: 1 each, Rfl: 0  •  blood sugar diagnostic strip, 1 strip daily. For use with meter to check blood glucose testing once a day dx code E11.65  NPI # [id number], Disp: 100 strip, Rfl: 1  •  blood-glucose meter kit, Use as instructed for testing blood glucose  Once a day.  Dx Code E 11.65 NPI # [id number], Disp: 1 each, Rfl: 0  •  glipiZIDE (GLUCOTROL) 10 mg tablet, TAKE 1 TABLET (10 MG TOTAL) BY MOUTH DAILY WITH BREAKFAST., Disp: 90 tablet, Rfl: 1  •  lancets 33 gauge misc, 1 each daily. Testing blood sugar Dx Code E11.65   NPI # [id number], Disp: 100 each, Rfl: 1  •  lancing device misc, 1 Device daily. Dx code E11.65  Testing for Blood sugar  NpI # [id number], Disp: 1 each, Rfl: 0  •  lisinopriL (PRINIVIL) 2.5 mg tablet, Take 1 tablet (2.5 mg total) by mouth once daily., Disp: 90 tablet, Rfl: 1  •  metFORMIN (GLUCOPHAGE) 1,000 mg tablet, Take 1 tablet (1,000 mg total) by mouth 2 (two) times a day with meals., Disp: 180 tablet, Rfl: 1      BP Readings from Last 3 Encounters:   12/12/22 140/70   07/14/22 130/70   09/23/21 130/72       Recent Lab results:  Lab Results   Component Value Date    CHOL 217 (H) 09/30/2022   ,   Lab Results   Component Value Date    HDL 56 09/30/2022   ,   Lab Results   Component Value Date    LDLCALC 137 (H) 09/30/2022   ,   Lab Results   Component Value Date    TRIG 121 09/30/2022        Lab Results   Component Value Date    GLUCOSE 148 (H) 09/30/2022   ,   Lab Results   Component Value Date    HGBA1C 7.0 (H) 09/30/2022         Lab Results   Component Value Date    CREATININE 1.00 09/30/2022       Lab Results   Component Value  Date    TSH 1.12 08/27/2021

## 2023-04-05 NOTE — TELEPHONE ENCOUNTER
Called spoke to raimundo he will call back to set up a appointment he was at work and unable to talk

## 2023-05-01 ENCOUNTER — OFFICE VISIT (OUTPATIENT)
Dept: FAMILY MEDICINE | Facility: CLINIC | Age: 68
End: 2023-05-01
Payer: COMMERCIAL

## 2023-05-01 VITALS
BODY MASS INDEX: 30.52 KG/M2 | OXYGEN SATURATION: 96 % | SYSTOLIC BLOOD PRESSURE: 130 MMHG | TEMPERATURE: 97.5 F | WEIGHT: 218 LBS | DIASTOLIC BLOOD PRESSURE: 74 MMHG | RESPIRATION RATE: 16 BRPM | HEIGHT: 71 IN | HEART RATE: 78 BPM

## 2023-05-01 DIAGNOSIS — Z12.11 COLON CANCER SCREENING: ICD-10-CM

## 2023-05-01 DIAGNOSIS — Z00.01 ENCOUNTER FOR GENERAL ADULT MEDICAL EXAMINATION WITH ABNORMAL FINDINGS: ICD-10-CM

## 2023-05-01 DIAGNOSIS — E11.29 TYPE 2 DIABETES MELLITUS WITH MICROALBUMINURIA, WITHOUT LONG-TERM CURRENT USE OF INSULIN (CMS/HCC): Primary | ICD-10-CM

## 2023-05-01 DIAGNOSIS — E78.5 HYPERLIPIDEMIA, UNSPECIFIED HYPERLIPIDEMIA TYPE: ICD-10-CM

## 2023-05-01 DIAGNOSIS — M79.642 LEFT HAND PAIN: ICD-10-CM

## 2023-05-01 DIAGNOSIS — R80.9 TYPE 2 DIABETES MELLITUS WITH MICROALBUMINURIA, WITHOUT LONG-TERM CURRENT USE OF INSULIN (CMS/HCC): Primary | ICD-10-CM

## 2023-05-01 PROBLEM — I10 DIABETES MELLITUS WITH COINCIDENT HYPERTENSION (CMS/HCC)  (CMS/HCC): Status: RESOLVED | Noted: 2018-10-12 | Resolved: 2023-05-01

## 2023-05-01 PROBLEM — E11.69 DM TYPE 2 WITH DIABETIC MIXED HYPERLIPIDEMIA (CMS/HCC)  (CMS/HCC): Status: RESOLVED | Noted: 2018-05-16 | Resolved: 2023-05-01

## 2023-05-01 PROBLEM — E11.9 DIABETES MELLITUS WITH COINCIDENT HYPERTENSION (CMS/HCC)  (CMS/HCC): Status: RESOLVED | Noted: 2018-10-12 | Resolved: 2023-05-01

## 2023-05-01 PROBLEM — E78.2 DM TYPE 2 WITH DIABETIC MIXED HYPERLIPIDEMIA (CMS/HCC)  (CMS/HCC): Status: RESOLVED | Noted: 2018-05-16 | Resolved: 2023-05-01

## 2023-05-01 PROCEDURE — 99214 OFFICE O/P EST MOD 30 MIN: CPT | Performed by: FAMILY MEDICINE

## 2023-05-01 PROCEDURE — 3008F BODY MASS INDEX DOCD: CPT | Performed by: FAMILY MEDICINE

## 2023-05-01 RX ORDER — METFORMIN HYDROCHLORIDE 1000 MG/1
1000 TABLET ORAL 2 TIMES DAILY WITH MEALS
Qty: 180 TABLET | Refills: 1 | Status: SHIPPED | OUTPATIENT
Start: 2023-05-01 | End: 2023-05-05 | Stop reason: SDUPTHER

## 2023-05-01 RX ORDER — DEXTROSE 4 G
1 TABLET,CHEWABLE ORAL DAILY
Qty: 50 EACH | Refills: 11 | Status: SHIPPED | OUTPATIENT
Start: 2023-05-01

## 2023-05-01 RX ORDER — GLIPIZIDE 10 MG/1
10 TABLET, FILM COATED, EXTENDED RELEASE ORAL DAILY
Qty: 90 TABLET | Refills: 3 | Status: SHIPPED | OUTPATIENT
Start: 2023-05-01 | End: 2024-05-02

## 2023-05-01 RX ORDER — ATORVASTATIN CALCIUM 10 MG/1
10 TABLET, FILM COATED ORAL DAILY
Qty: 90 TABLET | Refills: 1 | Status: SHIPPED | OUTPATIENT
Start: 2023-05-01 | End: 2023-11-03

## 2023-05-01 RX ORDER — LISINOPRIL 5 MG/1
5 TABLET ORAL DAILY
Qty: 90 TABLET | Refills: 1 | Status: SHIPPED | OUTPATIENT
Start: 2023-05-01 | End: 2023-11-03

## 2023-05-01 ASSESSMENT — ENCOUNTER SYMPTOMS
CONSTITUTIONAL NEGATIVE: 1
BACK PAIN: 0
GASTROINTESTINAL NEGATIVE: 1
NEUROLOGICAL NEGATIVE: 1
ARTHRALGIAS: 1
RESPIRATORY NEGATIVE: 1
EYES NEGATIVE: 1
ENDOCRINE NEGATIVE: 1
CARDIOVASCULAR NEGATIVE: 1
PSYCHIATRIC NEGATIVE: 1
NECK PAIN: 0

## 2023-05-01 NOTE — ASSESSMENT & PLAN NOTE
Unclear to me why he is not on a statin given that he is diabetic and his elevated cholesterol.  I started him on atorvastatin 10 mg daily and he will get his blood work done now and in 4 months and see me back at that time.

## 2023-05-01 NOTE — ASSESSMENT & PLAN NOTE
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  I am increasing his lisinopril to 5 mg daily.  I am switching his glipizide to the XL formulation without any change in his dosage and refilled his metformin which he will continue.  He had his diabetic eye exam in December.  Glucometer is ordered and desired ranges for his blood sugar were discussed.  I will see him back in 4 months for recheck.

## 2023-05-01 NOTE — ASSESSMENT & PLAN NOTE
Routine screening blood work is ordered.  He will be retiring in July and will have Medicare so we will plan a Medicare annual wellness visit in the fall.

## 2023-05-01 NOTE — PROGRESS NOTES
"      Subjective      Patient ID: Venu Phillips is a 68 y.o. male.  1955      This 68-year-old  male previously followed by other providers presents to establish care.  He offers no acute complaints.    His past medical history significant for type 2 diabetes mellitus diagnosed approximately 20 years ago and hyperlipidemia.    His past surgical history is negative except for repair of right third finger injury in childhood.    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  He quit smoking more than 20 years ago after less than 20 pack years.  He rarely drinks alcohol and has never been a heavy alcohol user.  He denies illicit drug use now or in the past.  He is looking forward to nursing home in a few months.      The following have been reviewed and updated as appropriate in this visit:   Tobacco  Allergies  Meds  Problems  Med Hx  Surg Hx  Fam Hx       Review of Systems   Constitutional: Negative.    HENT: Negative.    Eyes: Negative.    Respiratory: Negative.    Cardiovascular: Negative.    Gastrointestinal: Negative.    Endocrine: Negative.    Genitourinary: Negative.    Musculoskeletal: Positive for arthralgias (He has occasional pain in the base of his left thumb.). Negative for back pain, gait problem and neck pain.   Skin: Negative.    Neurological: Negative.    Psychiatric/Behavioral: Negative.        Objective     Vitals:    05/01/23 1529   BP: 130/74   BP Location: Left upper arm   Patient Position: Sitting   Pulse: 78   Resp: 16   Temp: 36.4 °C (97.5 °F)   TempSrc: Oral   SpO2: 96%   Weight: 98.9 kg (218 lb)   Height: 1.803 m (5' 11\")     Body mass index is 30.4 kg/m².    Physical Exam  Vitals reviewed.   Constitutional:       General: He is not in acute distress.     Appearance: He is obese. He is not ill-appearing.      Comments: He is weight is stable.   HENT:      Head: Normocephalic and atraumatic.      Right Ear: " Tympanic membrane, ear canal and external ear normal.      Left Ear: Tympanic membrane, ear canal and external ear normal.      Nose: No congestion or rhinorrhea.   Eyes:      Extraocular Movements: Extraocular movements intact.      Conjunctiva/sclera: Conjunctivae normal.   Neck:      Vascular: No carotid bruit.   Cardiovascular:      Rate and Rhythm: Normal rate and regular rhythm.      Pulses: Normal pulses.           Dorsalis pedis pulses are 2+ on the right side and 2+ on the left side.        Posterior tibial pulses are 2+ on the right side and 2+ on the left side.      Heart sounds: Normal heart sounds.   Pulmonary:      Effort: Pulmonary effort is normal.      Breath sounds: Normal breath sounds.   Musculoskeletal:      Cervical back: Neck supple.      Right lower leg: No edema.      Left lower leg: No edema.      Right foot: Normal range of motion. No deformity, bunion, Charcot foot, foot drop or prominent metatarsal heads.      Left foot: Normal range of motion. No deformity, bunion, Charcot foot, foot drop or prominent metatarsal heads.   Feet:      Right foot:      Protective Sensation: 4 sites tested. 4 sites sensed.      Skin integrity: Skin integrity normal.      Toenail Condition: Right toenails are normal.      Left foot:      Protective Sensation: 4 sites tested. 4 sites sensed.      Skin integrity: Skin integrity normal.      Toenail Condition: Left toenails are normal.   Lymphadenopathy:      Cervical: No cervical adenopathy.   Skin:     General: Skin is warm and dry.   Neurological:      General: No focal deficit present.      Mental Status: He is alert.   Psychiatric:         Mood and Affect: Mood normal.       Lab Results   Component Value Date    WBC 6.1 09/30/2022    HGB 14.4 09/30/2022    HCT 43.1 09/30/2022     09/30/2022    CHOL 217 (H) 09/30/2022    TRIG 121 09/30/2022    HDL 56 09/30/2022    ALT 20 09/30/2022    AST 23 09/30/2022     09/30/2022    K 4.7 09/30/2022    CL  102 09/30/2022    CREATININE 1.00 09/30/2022    BUN 17 09/30/2022    CO2 30 09/30/2022    TSH 1.12 08/27/2021    PSA 0.7 04/26/2019    HGBA1C 7.0 (H) 09/30/2022    MICROALBUR 4.7 09/30/2022     Assessment/Plan   Diagnoses and all orders for this visit:    Type 2 diabetes mellitus with microalbuminuria, without long-term current use of insulin (CMS/[facility]) (Primary)  Assessment & Plan:  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  I am increasing his lisinopril to 5 mg daily.  I am switching his glipizide to the XL formulation without any change in his dosage and refilled his metformin which he will continue.  He had his diabetic eye exam in December.  Glucometer is ordered and desired ranges for his blood sugar were discussed.  I will see him back in 4 months for recheck.    Orders:  -     CBC and differential; Future  -     Comprehensive metabolic panel; Future  -     Hemoglobin A1c; Future  -     Lipid panel; Future  -     Microalbumin / creatinine urine ratio; Future  -     TSH; Future  -     glipiZIDE (GLUCOTROL XL) 10 mg 24 hr tablet; Take 1 tablet (10 mg total) by mouth daily.  -     metFORMIN (GLUCOPHAGE) 1,000 mg tablet; Take 1 tablet (1,000 mg total) by mouth 2 (two) times a day with meals.  -     lisinopriL (PRINIVIL) 5 mg tablet; Take 1 tablet (5 mg total) by mouth daily.    Hyperlipidemia, unspecified hyperlipidemia type  Assessment & Plan:  Unclear to me why he is not on a statin given that he is diabetic and his elevated cholesterol.  I started him on atorvastatin 10 mg daily and he will get his blood work done now and in 4 months and see me back at that time.    Orders:  -     atorvastatin (LIPITOR) 10 mg tablet; Take 1 tablet (10 mg total) by mouth daily.    Colon cancer screening  Assessment & Plan:  Referred back to Dr. Morales as he is overdue for routine screening colonoscopy  last done 2011.    Orders:  -     Ambulatory referral to Gastroenterology; Future    Encounter for general adult medical examination with abnormal findings  Assessment & Plan:  Routine screening blood work is ordered.  He will be retiring in July and will have Medicare so we will plan a Medicare annual wellness visit in the fall.    Orders:  -     Hepatitis C antibody; Future    Left hand pain  Assessment & Plan:  He complains of intermittent pain at the base of his left thumb consistent with degenerative joint disease of the first carpometacarpal joint for which topicals were recommended to avoid exposure to NSAIDs.

## 2023-05-01 NOTE — ASSESSMENT & PLAN NOTE
He complains of intermittent pain at the base of his left thumb consistent with degenerative joint disease of the first carpometacarpal joint for which topicals were recommended to avoid exposure to NSAIDs.

## 2023-05-05 DIAGNOSIS — E11.29 TYPE 2 DIABETES MELLITUS WITH MICROALBUMINURIA, WITHOUT LONG-TERM CURRENT USE OF INSULIN (CMS/HCC): ICD-10-CM

## 2023-05-05 DIAGNOSIS — R80.9 TYPE 2 DIABETES MELLITUS WITH MICROALBUMINURIA, WITHOUT LONG-TERM CURRENT USE OF INSULIN (CMS/HCC): ICD-10-CM

## 2023-05-05 RX ORDER — METFORMIN HYDROCHLORIDE 1000 MG/1
1000 TABLET ORAL 2 TIMES DAILY WITH MEALS
Qty: 180 TABLET | Refills: 1 | Status: SHIPPED | OUTPATIENT
Start: 2023-05-05 | End: 2024-07-31

## 2023-05-05 NOTE — TELEPHONE ENCOUNTER
Medicine Refill Request    Last Office: 5/1/2023   Last Consult Visit: Visit date not found  Last Telemedicine Visit: 7/1/2020 Mayank Acosta, DO    Next Appointment: 9/1/2023      Current Outpatient Medications:   •  atorvastatin (LIPITOR) 10 mg tablet, Take 1 tablet (10 mg total) by mouth daily., Disp: 90 tablet, Rfl: 1  •  blood-glucose meter misc, 1 kit daily., Disp: 50 each, Rfl: 11  •  glipiZIDE (GLUCOTROL XL) 10 mg 24 hr tablet, Take 1 tablet (10 mg total) by mouth daily., Disp: 90 tablet, Rfl: 3  •  lisinopriL (PRINIVIL) 5 mg tablet, Take 1 tablet (5 mg total) by mouth daily., Disp: 90 tablet, Rfl: 1  •  metFORMIN (GLUCOPHAGE) 1,000 mg tablet, Take 1 tablet (1,000 mg total) by mouth 2 (two) times a day with meals., Disp: 180 tablet, Rfl: 1      BP Readings from Last 3 Encounters:   05/01/23 130/74   12/12/22 140/70   07/14/22 130/70       Recent Lab results:  Lab Results   Component Value Date    CHOL 217 (H) 09/30/2022   ,   Lab Results   Component Value Date    HDL 56 09/30/2022   ,   Lab Results   Component Value Date    LDLCALC 137 (H) 09/30/2022   ,   Lab Results   Component Value Date    TRIG 121 09/30/2022        Lab Results   Component Value Date    GLUCOSE 148 (H) 09/30/2022   ,   Lab Results   Component Value Date    HGBA1C 7.0 (H) 09/30/2022         Lab Results   Component Value Date    CREATININE 1.00 09/30/2022       Lab Results   Component Value Date    TSH 1.12 08/27/2021

## 2023-10-06 LAB
ALBUMIN SERPL-MCNC: 4.1 G/DL (ref 3.6–5.1)
ALBUMIN/CREAT UR: 38 MCG/MG CREAT
ALBUMIN/GLOB SERPL: 1.3 (CALC) (ref 1–2.5)
ALP SERPL-CCNC: 75 U/L (ref 35–144)
ALT SERPL-CCNC: 17 U/L (ref 9–46)
AST SERPL-CCNC: 21 U/L (ref 10–35)
BASOPHILS # BLD AUTO: 20 CELLS/UL (ref 0–200)
BASOPHILS NFR BLD AUTO: 0.3 %
BILIRUB SERPL-MCNC: 0.6 MG/DL (ref 0.2–1.2)
BUN SERPL-MCNC: 16 MG/DL (ref 7–25)
BUN/CREAT SERPL: ABNORMAL (CALC) (ref 6–22)
CALCIUM SERPL-MCNC: 9.1 MG/DL (ref 8.6–10.3)
CHLORIDE SERPL-SCNC: 103 MMOL/L (ref 98–110)
CHOLEST SERPL-MCNC: 161 MG/DL
CHOLEST/HDLC SERPL: 3 (CALC)
CO2 SERPL-SCNC: 28 MMOL/L (ref 20–32)
CREAT SERPL-MCNC: 0.98 MG/DL (ref 0.7–1.28)
CREAT UR-MCNC: 112 MG/DL (ref 20–320)
EGFRCR SERPLBLD CKD-EPI 2021: 83 ML/MIN/1.73M2
EOSINOPHIL # BLD AUTO: 154 CELLS/UL (ref 15–500)
EOSINOPHIL NFR BLD AUTO: 2.3 %
ERYTHROCYTE [DISTWIDTH] IN BLOOD BY AUTOMATED COUNT: 13.1 % (ref 11–15)
GLOBULIN SER CALC-MCNC: 3.2 G/DL (CALC) (ref 1.9–3.7)
GLUCOSE SERPL-MCNC: 142 MG/DL (ref 65–99)
HBA1C MFR BLD: 6.5 % OF TOTAL HGB
HCT VFR BLD AUTO: 42.3 % (ref 38.5–50)
HCV AB SERPL QL IA: NORMAL
HDLC SERPL-MCNC: 54 MG/DL
HGB BLD-MCNC: 14.2 G/DL (ref 13.2–17.1)
LDLC SERPL CALC-MCNC: 90 MG/DL (CALC)
LYMPHOCYTES # BLD AUTO: 2117 CELLS/UL (ref 850–3900)
LYMPHOCYTES NFR BLD AUTO: 31.6 %
MCH RBC QN AUTO: 29.3 PG (ref 27–33)
MCHC RBC AUTO-ENTMCNC: 33.6 G/DL (ref 32–36)
MCV RBC AUTO: 87.4 FL (ref 80–100)
MICROALBUMIN UR-MCNC: 4.3 MG/DL
MONOCYTES # BLD AUTO: 603 CELLS/UL (ref 200–950)
MONOCYTES NFR BLD AUTO: 9 %
NEUTROPHILS # BLD AUTO: 3806 CELLS/UL (ref 1500–7800)
NEUTROPHILS NFR BLD AUTO: 56.8 %
NONHDLC SERPL-MCNC: 107 MG/DL (CALC)
PLATELET # BLD AUTO: 218 THOUSAND/UL (ref 140–400)
PMV BLD REES-ECKER: 10.2 FL (ref 7.5–12.5)
POTASSIUM SERPL-SCNC: 4.7 MMOL/L (ref 3.5–5.3)
PROT SERPL-MCNC: 7.3 G/DL (ref 6.1–8.1)
RBC # BLD AUTO: 4.84 MILLION/UL (ref 4.2–5.8)
SODIUM SERPL-SCNC: 138 MMOL/L (ref 135–146)
TRIGL SERPL-MCNC: 76 MG/DL
TSH SERPL-ACNC: 1.46 MIU/L (ref 0.4–4.5)
WBC # BLD AUTO: 6.7 THOUSAND/UL (ref 3.8–10.8)

## 2023-10-09 ENCOUNTER — TELEPHONE (OUTPATIENT)
Dept: FAMILY MEDICINE | Facility: CLINIC | Age: 68
End: 2023-10-09
Payer: COMMERCIAL

## 2023-10-09 NOTE — TELEPHONE ENCOUNTER
Patient called and wanted to  a copy of his lab results. Spoke to patient and notified him that labs will be in an envelope at the  for him to .

## 2023-11-03 DIAGNOSIS — E11.29 TYPE 2 DIABETES MELLITUS WITH MICROALBUMINURIA, WITHOUT LONG-TERM CURRENT USE OF INSULIN (CMS/HCC): ICD-10-CM

## 2023-11-03 DIAGNOSIS — E78.5 HYPERLIPIDEMIA, UNSPECIFIED HYPERLIPIDEMIA TYPE: ICD-10-CM

## 2023-11-03 DIAGNOSIS — R80.9 TYPE 2 DIABETES MELLITUS WITH MICROALBUMINURIA, WITHOUT LONG-TERM CURRENT USE OF INSULIN (CMS/HCC): ICD-10-CM

## 2023-11-03 RX ORDER — LISINOPRIL 5 MG/1
5 TABLET ORAL DAILY
Qty: 90 TABLET | Refills: 1 | Status: SHIPPED | OUTPATIENT
Start: 2023-11-03 | End: 2024-08-19 | Stop reason: SDUPTHER

## 2023-11-03 RX ORDER — ATORVASTATIN CALCIUM 10 MG/1
10 TABLET, FILM COATED ORAL DAILY
Qty: 90 TABLET | Refills: 1 | Status: SHIPPED | OUTPATIENT
Start: 2023-11-03 | End: 2024-08-19 | Stop reason: SDUPTHER

## 2023-11-03 NOTE — TELEPHONE ENCOUNTER
Medicine Refill Request    Last Office Visit: 5/1/2023   Last Consult Visit: Visit date not found  Last Telemedicine Visit: 7/1/2020 Mayank Acosta, DO    Next Appointment: Visit date not found      Current Outpatient Medications:     atorvastatin (LIPITOR) 10 mg tablet, Take 1 tablet (10 mg total) by mouth daily., Disp: 90 tablet, Rfl: 1    blood-glucose meter misc, 1 kit daily., Disp: 50 each, Rfl: 11    glipiZIDE (GLUCOTROL XL) 10 mg 24 hr tablet, Take 1 tablet (10 mg total) by mouth daily., Disp: 90 tablet, Rfl: 3    lisinopriL (PRINIVIL) 5 mg tablet, Take 1 tablet (5 mg total) by mouth daily., Disp: 90 tablet, Rfl: 1    metFORMIN (GLUCOPHAGE) 1,000 mg tablet, Take 1 tablet (1,000 mg total) by mouth 2 (two) times a day with meals., Disp: 180 tablet, Rfl: 1      BP Readings from Last 3 Encounters:   05/01/23 130/74   12/12/22 140/70   07/14/22 130/70       Recent Lab results:  Lab Results   Component Value Date    CHOL 161 10/05/2023   ,   Lab Results   Component Value Date    HDL 54 10/05/2023   ,   Lab Results   Component Value Date    LDLCALC 90 10/05/2023   ,   Lab Results   Component Value Date    TRIG 76 10/05/2023        Lab Results   Component Value Date    GLUCOSE 142 (H) 10/05/2023   ,   Lab Results   Component Value Date    HGBA1C 6.5 (H) 10/05/2023         Lab Results   Component Value Date    CREATININE 0.98 10/05/2023       Lab Results   Component Value Date    TSH 1.46 10/05/2023           Lab Results   Component Value Date    HGBA1C 6.5 (H) 10/05/2023

## 2024-05-02 DIAGNOSIS — E11.29 TYPE 2 DIABETES MELLITUS WITH MICROALBUMINURIA, WITHOUT LONG-TERM CURRENT USE OF INSULIN (CMS/HCC): ICD-10-CM

## 2024-05-02 DIAGNOSIS — R80.9 TYPE 2 DIABETES MELLITUS WITH MICROALBUMINURIA, WITHOUT LONG-TERM CURRENT USE OF INSULIN (CMS/HCC): ICD-10-CM

## 2024-05-02 RX ORDER — GLIPIZIDE 10 MG/1
10 TABLET, FILM COATED, EXTENDED RELEASE ORAL DAILY
Qty: 30 TABLET | Refills: 0 | Status: SHIPPED | OUTPATIENT
Start: 2024-05-02 | End: 2024-05-30

## 2024-05-02 NOTE — TELEPHONE ENCOUNTER
Medicine Refill Request    Last Office Visit: 5/1/2023   Last Consult Visit: Visit date not found  Last Telemedicine Visit: 7/1/2020 Mayank Acosta, DO    Next Appointment: Visit date not found      Current Outpatient Medications:     atorvastatin (LIPITOR) 10 mg tablet, TAKE 1 TABLET BY MOUTH EVERY DAY, Disp: 90 tablet, Rfl: 1    blood-glucose meter misc, 1 kit daily., Disp: 50 each, Rfl: 11    glipiZIDE (GLUCOTROL XL) 10 mg 24 hr tablet, Take 1 tablet (10 mg total) by mouth daily., Disp: 90 tablet, Rfl: 3    lisinopriL (PRINIVIL) 5 mg tablet, TAKE 1 TABLET (5 MG TOTAL) BY MOUTH DAILY., Disp: 90 tablet, Rfl: 1    metFORMIN (GLUCOPHAGE) 1,000 mg tablet, Take 1 tablet (1,000 mg total) by mouth 2 (two) times a day with meals., Disp: 180 tablet, Rfl: 1      BP Readings from Last 3 Encounters:   05/01/23 130/74   12/12/22 140/70   07/14/22 130/70       Recent Lab results:  Lab Results   Component Value Date    CHOL 161 10/05/2023   ,   Lab Results   Component Value Date    HDL 54 10/05/2023   ,   Lab Results   Component Value Date    LDLCALC 90 10/05/2023   ,   Lab Results   Component Value Date    TRIG 76 10/05/2023        Lab Results   Component Value Date    GLUCOSE 142 (H) 10/05/2023   ,   Lab Results   Component Value Date    HGBA1C 6.5 (H) 10/05/2023         Lab Results   Component Value Date    CREATININE 0.98 10/05/2023       Lab Results   Component Value Date    TSH 1.46 10/05/2023           Lab Results   Component Value Date    HGBA1C 6.5 (H) 10/05/2023

## 2024-07-05 DIAGNOSIS — E11.29 TYPE 2 DIABETES MELLITUS WITH MICROALBUMINURIA, WITHOUT LONG-TERM CURRENT USE OF INSULIN (CMS/HCC): ICD-10-CM

## 2024-07-05 DIAGNOSIS — R80.9 TYPE 2 DIABETES MELLITUS WITH MICROALBUMINURIA, WITHOUT LONG-TERM CURRENT USE OF INSULIN (CMS/HCC): ICD-10-CM

## 2024-07-05 RX ORDER — GLIPIZIDE 10 MG/1
10 TABLET, FILM COATED, EXTENDED RELEASE ORAL DAILY
Qty: 90 TABLET | Refills: 0 | Status: SHIPPED | OUTPATIENT
Start: 2024-07-05 | End: 2024-09-27

## 2024-07-05 NOTE — TELEPHONE ENCOUNTER
Medicine Refill Request    Last Office Visit: 5/1/2023   Last Consult Visit: Visit date not found  Last Telemedicine Visit: 7/1/2020 Mayank Acosta, DO    Next Appointment: Visit date not found      Current Outpatient Medications:     atorvastatin (LIPITOR) 10 mg tablet, TAKE 1 TABLET BY MOUTH EVERY DAY, Disp: 90 tablet, Rfl: 1    blood-glucose meter misc, 1 kit daily., Disp: 50 each, Rfl: 11    glipiZIDE (GLUCOTROL XL) 10 mg 24 hr tablet, Take 1 tablet (10 mg total) by mouth daily., Disp: 30 tablet, Rfl: 0    lisinopriL (PRINIVIL) 5 mg tablet, TAKE 1 TABLET (5 MG TOTAL) BY MOUTH DAILY., Disp: 90 tablet, Rfl: 1    metFORMIN (GLUCOPHAGE) 1,000 mg tablet, Take 1 tablet (1,000 mg total) by mouth 2 (two) times a day with meals., Disp: 180 tablet, Rfl: 1      BP Readings from Last 3 Encounters:   05/01/23 130/74   12/12/22 140/70   07/14/22 130/70       Recent Lab results:  Lab Results   Component Value Date    CHOL 161 10/05/2023   ,   Lab Results   Component Value Date    HDL 54 10/05/2023   ,   Lab Results   Component Value Date    LDLCALC 90 10/05/2023   ,   Lab Results   Component Value Date    TRIG 76 10/05/2023        Lab Results   Component Value Date    GLUCOSE 142 (H) 10/05/2023   ,   Lab Results   Component Value Date    HGBA1C 6.5 (H) 10/05/2023         Lab Results   Component Value Date    CREATININE 0.98 10/05/2023       Lab Results   Component Value Date    TSH 1.46 10/05/2023           Lab Results   Component Value Date    HGBA1C 6.5 (H) 10/05/2023

## 2024-08-19 DIAGNOSIS — E11.29 TYPE 2 DIABETES MELLITUS WITH MICROALBUMINURIA, WITHOUT LONG-TERM CURRENT USE OF INSULIN (CMS/HCC): ICD-10-CM

## 2024-08-19 DIAGNOSIS — R80.9 TYPE 2 DIABETES MELLITUS WITH MICROALBUMINURIA, WITHOUT LONG-TERM CURRENT USE OF INSULIN (CMS/HCC): ICD-10-CM

## 2024-08-19 DIAGNOSIS — E78.5 HYPERLIPIDEMIA, UNSPECIFIED HYPERLIPIDEMIA TYPE: ICD-10-CM

## 2024-08-19 RX ORDER — ATORVASTATIN CALCIUM 10 MG/1
10 TABLET, FILM COATED ORAL DAILY
Qty: 90 TABLET | Refills: 1 | Status: SHIPPED | OUTPATIENT
Start: 2024-08-19 | End: 2025-02-17

## 2024-08-19 RX ORDER — LISINOPRIL 5 MG/1
5 TABLET ORAL DAILY
Qty: 90 TABLET | Refills: 1 | Status: SHIPPED | OUTPATIENT
Start: 2024-08-19 | End: 2025-02-17

## 2024-08-19 NOTE — TELEPHONE ENCOUNTER
Medicine Refill Request    Last Office Visit: 5/1/2023   Last Consult Visit: Visit date not found  Last Telemedicine Visit: Visit date not found    Next Appointment: 11/25/2024      Current Outpatient Medications:     atorvastatin (LIPITOR) 10 mg tablet, TAKE 1 TABLET BY MOUTH EVERY DAY, Disp: 90 tablet, Rfl: 1    blood-glucose meter misc, 1 kit daily., Disp: 50 each, Rfl: 11    glipiZIDE (GLUCOTROL XL) 10 mg 24 hr tablet, Take 1 tablet (10 mg total) by mouth daily., Disp: 90 tablet, Rfl: 0    lisinopriL (PRINIVIL) 5 mg tablet, TAKE 1 TABLET (5 MG TOTAL) BY MOUTH DAILY., Disp: 90 tablet, Rfl: 1    metFORMIN (GLUCOPHAGE) 1,000 mg tablet, Take 1 tablet (1,000 mg total) by mouth 2 (two) times a day with meals., Disp: 180 tablet, Rfl: 3      BP Readings from Last 3 Encounters:   05/01/23 130/74   12/12/22 140/70   07/14/22 130/70       Recent Lab results:  Lab Results   Component Value Date    CHOL 161 10/05/2023   ,   Lab Results   Component Value Date    HDL 54 10/05/2023   ,   Lab Results   Component Value Date    LDLCALC 90 10/05/2023   ,   Lab Results   Component Value Date    TRIG 76 10/05/2023        Lab Results   Component Value Date    GLUCOSE 142 (H) 10/05/2023   ,   Lab Results   Component Value Date    HGBA1C 6.5 (H) 10/05/2023         Lab Results   Component Value Date    CREATININE 0.98 10/05/2023       Lab Results   Component Value Date    TSH 1.46 10/05/2023           Lab Results   Component Value Date    HGBA1C 6.5 (H) 10/05/2023

## 2024-09-27 DIAGNOSIS — E11.29 TYPE 2 DIABETES MELLITUS WITH MICROALBUMINURIA, WITHOUT LONG-TERM CURRENT USE OF INSULIN (CMS/HCC): ICD-10-CM

## 2024-09-27 DIAGNOSIS — R80.9 TYPE 2 DIABETES MELLITUS WITH MICROALBUMINURIA, WITHOUT LONG-TERM CURRENT USE OF INSULIN (CMS/HCC): ICD-10-CM

## 2024-09-27 RX ORDER — GLIPIZIDE 10 MG/1
10 TABLET, FILM COATED, EXTENDED RELEASE ORAL DAILY
Qty: 90 TABLET | Refills: 3 | Status: SHIPPED | OUTPATIENT
Start: 2024-09-27

## 2024-09-27 NOTE — TELEPHONE ENCOUNTER
Medicine Refill Request    Last Office Visit: 5/1/2023   Last Consult Visit: Visit date not found  Last Telemedicine Visit: Visit date not found    Next Appointment: 11/25/2024      Current Outpatient Medications:     atorvastatin (LIPITOR) 10 mg tablet, Take 1 tablet (10 mg total) by mouth daily., Disp: 90 tablet, Rfl: 1    blood-glucose meter misc, 1 kit daily., Disp: 50 each, Rfl: 11    glipiZIDE (GLUCOTROL XL) 10 mg 24 hr tablet, Take 1 tablet (10 mg total) by mouth daily., Disp: 90 tablet, Rfl: 0    lisinopriL (PRINIVIL) 5 mg tablet, Take 1 tablet (5 mg total) by mouth daily., Disp: 90 tablet, Rfl: 1    metFORMIN (GLUCOPHAGE) 1,000 mg tablet, Take 1 tablet (1,000 mg total) by mouth 2 (two) times a day with meals., Disp: 180 tablet, Rfl: 3      BP Readings from Last 3 Encounters:   05/01/23 130/74   12/12/22 140/70   07/14/22 130/70       Recent Lab results:  Lab Results   Component Value Date    CHOL 161 10/05/2023   ,   Lab Results   Component Value Date    HDL 54 10/05/2023   ,   Lab Results   Component Value Date    LDLCALC 90 10/05/2023   ,   Lab Results   Component Value Date    TRIG 76 10/05/2023        Lab Results   Component Value Date    GLUCOSE 142 (H) 10/05/2023   ,   Lab Results   Component Value Date    HGBA1C 6.5 (H) 10/05/2023         Lab Results   Component Value Date    CREATININE 0.98 10/05/2023       Lab Results   Component Value Date    TSH 1.46 10/05/2023           Lab Results   Component Value Date    HGBA1C 6.5 (H) 10/05/2023

## 2024-11-06 ENCOUNTER — APPOINTMENT (OUTPATIENT)
Age: 69
Setting detail: DERMATOLOGY
End: 2024-11-06

## 2024-11-06 DIAGNOSIS — L81.4 OTHER MELANIN HYPERPIGMENTATION: ICD-10-CM

## 2024-11-06 DIAGNOSIS — L57.8 OTHER SKIN CHANGES DUE TO CHRONIC EXPOSURE TO NONIONIZING RADIATION: ICD-10-CM

## 2024-11-06 DIAGNOSIS — D18.0 HEMANGIOMA: ICD-10-CM

## 2024-11-06 DIAGNOSIS — D22 MELANOCYTIC NEVI: ICD-10-CM

## 2024-11-06 DIAGNOSIS — L82.1 OTHER SEBORRHEIC KERATOSIS: ICD-10-CM

## 2024-11-06 DIAGNOSIS — L57.0 ACTINIC KERATOSIS: ICD-10-CM

## 2024-11-06 PROBLEM — D18.01 HEMANGIOMA OF SKIN AND SUBCUTANEOUS TISSUE: Status: ACTIVE | Noted: 2024-11-06

## 2024-11-06 PROBLEM — D22.5 MELANOCYTIC NEVI OF TRUNK: Status: ACTIVE | Noted: 2024-11-06

## 2024-11-06 PROCEDURE — 17000 DESTRUCT PREMALG LESION: CPT

## 2024-11-06 PROCEDURE — 99213 OFFICE O/P EST LOW 20 MIN: CPT | Mod: 25

## 2024-11-06 PROCEDURE — 17003 DESTRUCT PREMALG LES 2-14: CPT

## 2024-11-06 PROCEDURE — OTHER LIQUID NITROGEN: OTHER

## 2024-11-06 PROCEDURE — OTHER SUNSCREEN RECOMMENDATIONS: OTHER

## 2024-11-06 PROCEDURE — OTHER COUNSELING: OTHER

## 2024-11-06 ASSESSMENT — LOCATION DETAILED DESCRIPTION DERM
LOCATION DETAILED: MIDDLE STERNUM
LOCATION DETAILED: RIGHT INFERIOR UPPER BACK
LOCATION DETAILED: XIPHOID
LOCATION DETAILED: RIGHT MID-UPPER BACK
LOCATION DETAILED: RIGHT MEDIAL FRONTAL SCALP
LOCATION DETAILED: RIGHT CENTRAL MALAR CHEEK
LOCATION DETAILED: LOWER STERNUM
LOCATION DETAILED: LEFT SUPERIOR PARIETAL SCALP
LOCATION DETAILED: RIGHT SUPERIOR PARIETAL SCALP
LOCATION DETAILED: RIGHT SUPERIOR OCCIPITAL SCALP
LOCATION DETAILED: LEFT CENTRAL PARIETAL SCALP

## 2024-11-06 ASSESSMENT — LOCATION SIMPLE DESCRIPTION DERM
LOCATION SIMPLE: RIGHT CHEEK
LOCATION SIMPLE: RIGHT SCALP
LOCATION SIMPLE: ABDOMEN
LOCATION SIMPLE: SCALP
LOCATION SIMPLE: RIGHT UPPER BACK
LOCATION SIMPLE: RIGHT OCCIPITAL SCALP
LOCATION SIMPLE: CHEST

## 2024-11-06 ASSESSMENT — LOCATION ZONE DERM
LOCATION ZONE: SCALP
LOCATION ZONE: TRUNK
LOCATION ZONE: FACE

## 2024-11-06 NOTE — PROCEDURE: LIQUID NITROGEN

## 2025-02-07 ENCOUNTER — TELEPHONE (OUTPATIENT)
Dept: FAMILY MEDICINE | Facility: CLINIC | Age: 70
End: 2025-02-07

## 2025-02-07 NOTE — TELEPHONE ENCOUNTER
Test Order Request   Patient PCP: Lela Castillo MD  Next Office Visit: 2/27/2025  Preferred Lab: [facility]  [address] [place] [id number]  Tests Requested: blood draw  , MyChart, or US Mail?:     The practice will reach out to discuss your request within 2 business days.

## 2025-02-15 DIAGNOSIS — E78.5 HYPERLIPIDEMIA, UNSPECIFIED HYPERLIPIDEMIA TYPE: ICD-10-CM

## 2025-02-15 DIAGNOSIS — R80.9 TYPE 2 DIABETES MELLITUS WITH MICROALBUMINURIA, WITHOUT LONG-TERM CURRENT USE OF INSULIN (CMS/HCC): ICD-10-CM

## 2025-02-15 DIAGNOSIS — E11.29 TYPE 2 DIABETES MELLITUS WITH MICROALBUMINURIA, WITHOUT LONG-TERM CURRENT USE OF INSULIN (CMS/HCC): ICD-10-CM

## 2025-02-17 RX ORDER — LISINOPRIL 5 MG/1
5 TABLET ORAL DAILY
Qty: 90 TABLET | Refills: 3 | Status: SHIPPED | OUTPATIENT
Start: 2025-02-17 | End: 2025-02-27

## 2025-02-17 RX ORDER — ATORVASTATIN CALCIUM 10 MG/1
10 TABLET, FILM COATED ORAL DAILY
Qty: 90 TABLET | Refills: 3 | Status: SHIPPED | OUTPATIENT
Start: 2025-02-17 | End: 2025-02-27

## 2025-02-17 NOTE — TELEPHONE ENCOUNTER
Medicine Refill Request    Last Office Visit: 5/1/2023   Last Consult Visit: Visit date not found  Last Telemedicine Visit: Visit date not found    Next Appointment: 2/27/2025      Current Outpatient Medications:     atorvastatin (LIPITOR) 10 mg tablet, Take 1 tablet (10 mg total) by mouth daily., Disp: 90 tablet, Rfl: 1    blood-glucose meter misc, 1 kit daily., Disp: 50 each, Rfl: 11    glipiZIDE (GLUCOTROL XL) 10 mg 24 hr tablet, Take 1 tablet (10 mg total) by mouth daily., Disp: 90 tablet, Rfl: 3    lisinopriL (PRINIVIL) 5 mg tablet, Take 1 tablet (5 mg total) by mouth daily., Disp: 90 tablet, Rfl: 1    metFORMIN (GLUCOPHAGE) 1,000 mg tablet, Take 1 tablet (1,000 mg total) by mouth 2 (two) times a day with meals., Disp: 180 tablet, Rfl: 3    BP Readings from Last 3 Encounters:   05/01/23 130/74   12/12/22 140/70   07/14/22 130/70       Recent Lab results:  Lab Results   Component Value Date    CHOL 161 10/05/2023   ,   Lab Results   Component Value Date    HDL 54 10/05/2023   ,   Lab Results   Component Value Date    LDLCALC 90 10/05/2023   ,   Lab Results   Component Value Date    TRIG 76 10/05/2023        Lab Results   Component Value Date    GLUCOSE 142 (H) 10/05/2023   ,   Lab Results   Component Value Date    HGBA1C 6.5 (H) 10/05/2023         Lab Results   Component Value Date    CREATININE 0.98 10/05/2023       Lab Results   Component Value Date    TSH 1.46 10/05/2023           Lab Results   Component Value Date    HGBA1C 6.5 (H) 10/05/2023

## 2025-02-18 LAB
ALBUMIN SERPL-MCNC: 4.3 G/DL (ref 3.6–5.1)
ALBUMIN/CREAT UR: 132 MG/G CREAT
ALBUMIN/GLOB SERPL: 1.4 (CALC) (ref 1–2.5)
ALP SERPL-CCNC: 87 U/L (ref 35–144)
ALT SERPL-CCNC: 19 U/L (ref 9–46)
AST SERPL-CCNC: 20 U/L (ref 10–35)
BASOPHILS # BLD AUTO: 33 CELLS/UL (ref 0–200)
BASOPHILS NFR BLD AUTO: 0.4 %
BILIRUB SERPL-MCNC: 0.6 MG/DL (ref 0.2–1.2)
BUN SERPL-MCNC: 18 MG/DL (ref 7–25)
BUN/CREAT SERPL: ABNORMAL (CALC) (ref 6–22)
CALCIUM SERPL-MCNC: 9.3 MG/DL (ref 8.6–10.3)
CHLORIDE SERPL-SCNC: 102 MMOL/L (ref 98–110)
CHOLEST SERPL-MCNC: 190 MG/DL
CHOLEST/HDLC SERPL: 3.4 (CALC)
CO2 SERPL-SCNC: 30 MMOL/L (ref 20–32)
CREAT SERPL-MCNC: 1 MG/DL (ref 0.7–1.28)
CREAT UR-MCNC: 53 MG/DL (ref 20–320)
EGFRCR SERPLBLD CKD-EPI 2021: 80 ML/MIN/1.73M2
EOSINOPHIL # BLD AUTO: 279 CELLS/UL (ref 15–500)
EOSINOPHIL NFR BLD AUTO: 3.4 %
ERYTHROCYTE [DISTWIDTH] IN BLOOD BY AUTOMATED COUNT: 13.1 % (ref 11–15)
GLOBULIN SER CALC-MCNC: 3.1 G/DL (CALC) (ref 1.9–3.7)
GLUCOSE SERPL-MCNC: 188 MG/DL (ref 65–99)
HBA1C MFR BLD: 9.1 % OF TOTAL HGB
HCT VFR BLD AUTO: 46 % (ref 38.5–50)
HDLC SERPL-MCNC: 56 MG/DL
HGB BLD-MCNC: 14.8 G/DL (ref 13.2–17.1)
LDLC SERPL CALC-MCNC: 112 MG/DL (CALC)
LYMPHOCYTES # BLD AUTO: 2370 CELLS/UL (ref 850–3900)
LYMPHOCYTES NFR BLD AUTO: 28.9 %
MCH RBC QN AUTO: 28.4 PG (ref 27–33)
MCHC RBC AUTO-ENTMCNC: 32.2 G/DL (ref 32–36)
MCV RBC AUTO: 88.1 FL (ref 80–100)
MICROALBUMIN UR-MCNC: 7 MG/DL
MONOCYTES # BLD AUTO: 672 CELLS/UL (ref 200–950)
MONOCYTES NFR BLD AUTO: 8.2 %
NEUTROPHILS # BLD AUTO: 4846 CELLS/UL (ref 1500–7800)
NEUTROPHILS NFR BLD AUTO: 59.1 %
NONHDLC SERPL-MCNC: 134 MG/DL (CALC)
PLATELET # BLD AUTO: 247 THOUSAND/UL (ref 140–400)
PMV BLD REES-ECKER: 10.1 FL (ref 7.5–12.5)
POTASSIUM SERPL-SCNC: 4.7 MMOL/L (ref 3.5–5.3)
PROT SERPL-MCNC: 7.4 G/DL (ref 6.1–8.1)
RBC # BLD AUTO: 5.22 MILLION/UL (ref 4.2–5.8)
SODIUM SERPL-SCNC: 137 MMOL/L (ref 135–146)
TRIGL SERPL-MCNC: 116 MG/DL
WBC # BLD AUTO: 8.2 THOUSAND/UL (ref 3.8–10.8)

## 2025-02-27 ENCOUNTER — OFFICE VISIT (OUTPATIENT)
Dept: FAMILY MEDICINE | Facility: CLINIC | Age: 70
End: 2025-02-27
Payer: MEDICARE

## 2025-02-27 VITALS
OXYGEN SATURATION: 99 % | HEART RATE: 71 BPM | BODY MASS INDEX: 30.83 KG/M2 | WEIGHT: 220.2 LBS | DIASTOLIC BLOOD PRESSURE: 82 MMHG | RESPIRATION RATE: 16 BRPM | TEMPERATURE: 97.6 F | SYSTOLIC BLOOD PRESSURE: 134 MMHG | HEIGHT: 71 IN

## 2025-02-27 DIAGNOSIS — R80.9 TYPE 2 DIABETES MELLITUS WITH MICROALBUMINURIA, WITHOUT LONG-TERM CURRENT USE OF INSULIN (CMS/HCC): ICD-10-CM

## 2025-02-27 DIAGNOSIS — Z13.6 SCREENING FOR AAA (ABDOMINAL AORTIC ANEURYSM): ICD-10-CM

## 2025-02-27 DIAGNOSIS — E11.29 TYPE 2 DIABETES MELLITUS WITH MICROALBUMINURIA, WITHOUT LONG-TERM CURRENT USE OF INSULIN (CMS/HCC): ICD-10-CM

## 2025-02-27 DIAGNOSIS — E66.811 CLASS 1 OBESITY DUE TO EXCESS CALORIES WITH SERIOUS COMORBIDITY AND BODY MASS INDEX (BMI) OF 30.0 TO 30.9 IN ADULT: ICD-10-CM

## 2025-02-27 DIAGNOSIS — E66.09 CLASS 1 OBESITY DUE TO EXCESS CALORIES WITH SERIOUS COMORBIDITY AND BODY MASS INDEX (BMI) OF 30.0 TO 30.9 IN ADULT: ICD-10-CM

## 2025-02-27 DIAGNOSIS — E78.2 MIXED HYPERLIPIDEMIA: ICD-10-CM

## 2025-02-27 DIAGNOSIS — G47.33 OBSTRUCTIVE SLEEP APNEA SYNDROME: ICD-10-CM

## 2025-02-27 DIAGNOSIS — Z00.00 MEDICARE ANNUAL WELLNESS VISIT, INITIAL: Primary | ICD-10-CM

## 2025-02-27 PROBLEM — Z12.11 COLON CANCER SCREENING: Status: RESOLVED | Noted: 2021-09-26 | Resolved: 2025-02-27

## 2025-02-27 PROBLEM — M79.642 LEFT HAND PAIN: Status: RESOLVED | Noted: 2023-05-01 | Resolved: 2025-02-27

## 2025-02-27 PROCEDURE — 99214 OFFICE O/P EST MOD 30 MIN: CPT | Mod: 25 | Performed by: FAMILY MEDICINE

## 2025-02-27 PROCEDURE — G0402 INITIAL PREVENTIVE EXAM: HCPCS | Performed by: FAMILY MEDICINE

## 2025-02-27 RX ORDER — ATORVASTATIN CALCIUM 20 MG/1
20 TABLET, FILM COATED ORAL DAILY
Qty: 90 TABLET | Refills: 3 | Status: SHIPPED | OUTPATIENT
Start: 2025-02-27 | End: 2026-02-27

## 2025-02-27 ASSESSMENT — ENCOUNTER SYMPTOMS
GASTROINTESTINAL NEGATIVE: 1
PSYCHIATRIC NEGATIVE: 1
EYES NEGATIVE: 1
RESPIRATORY NEGATIVE: 1
MUSCULOSKELETAL NEGATIVE: 1
CONSTITUTIONAL NEGATIVE: 1
NEUROLOGICAL NEGATIVE: 1
CARDIOVASCULAR NEGATIVE: 1

## 2025-02-27 ASSESSMENT — PATIENT HEALTH QUESTIONNAIRE - PHQ9: SUM OF ALL RESPONSES TO PHQ9 QUESTIONS 1 & 2: 0

## 2025-02-27 ASSESSMENT — MINI COG
COMPLETED: YES
TOTAL SCORE: 5

## 2025-02-27 NOTE — PATIENT INSTRUCTIONS
Your Personalized Prevention Plan Services (PPPS)    Preventive Services Checklist (Assumes Average Risk Unless Otherwise Noted):    Health Maintenance Topics with due status: Overdue       Topic Date Due    Annual Dilated Retinal Exam Never done    Depression Screening Never done    Medicare Annual Wellness Visit Never done    Abdominal Aortic Aneurysm (AAA) Screen Never done    Falls Risk Screening Never done    Colorectal Cancer Screening 07/06/2021    Zoster Vaccine 09/22/2022    Diabetic Foot Exam 05/01/2024    COVID-19 Vaccine 09/01/2024     Health Maintenance Topics with due status: Not Due       Topic Last Completion Date    DTaP, Tdap, and Td Vaccines 10/12/2018    Diabetes Kidney Health Evaluation: eGFR 02/18/2025    Diabetes Kidney Health Evaluation:  uACR 02/18/2025    Hemoglobin A1C 02/18/2025    RSV Vaccine Not Due     Health Maintenance Topics with due status: Completed       Topic Last Completion Date    Hepatitis C Screening 10/05/2023    Influenza Vaccine 09/23/2024    Pneumococcal (65 years and older) 09/23/2024     Health Maintenance Topics with due status: Aged Out       Topic Date Due    Meningococcal ACWY Aged Out    RSV <20 months Aged Out    HIB Vaccines Aged Out    Hepatitis B Vaccines Aged Out    IPV Vaccines Aged Out    Meningococcal B Aged Out    HPV Vaccines Aged Out       You May Be Eligible for These Additional Preventive Services   (Assumes Average Risk Unless Otherwise Noted)  Diabetes Screening Any 1 risk factor: hypertension, dyslipidemia, obesity, high glucose; or Any 2 risk factors: >=66yo, overweight, family history diabetes (covered every 6 months)   Hepatitis C Screening Any 1 risk factor: 1) blood transfusion before 1992,   2) current or past injection drug use (annually for high risk; if born between 74[birth year]05, see above for status).   Vaccine: Hepatitis B As necessary if at-risk: hemophilia, ESRD, diabetes, living with individual infected with hep B,  healthcare worker with frequent contact with blood/bodily fluids (series covered once)   Sexually Transmitted Diseases (STDs) As necessary chlamydia, gonorrhea, syphilis, hepatitis B (covered annually)  HIV if any 1 risk factor present: 1) <14yo or >64yo and at increased risk or 2) 15-64yo and ask for it (covered annually)   Lung Cancer Screening Low dose chest CT if all three risk factors: 1) 50-76yo, 2) smoker or quit within last 15y, 3) >=20 pack years (covered annually).  No results found for this or any previous visit.       Cholesterol Screening No signs or symptoms of cardiovascular disease (screening covered every 5 years).   Prostate Cancer Screening >= 51yo if patient desires test after weighting potential harms and benefits (covered annually)   Abdominal Aortic Aneurysm Screening Any 1 risk factor: 1) family history of AAA or 2) 65-76yo and smoked 100+ cigarettes in a lifetime (covered once).   No results found for this or any previous visit.   No results found for this or any previous visit.     Glaucoma Screening Any 1 risk factor: 1) diabetes, 2) family history glaucoma, 3)  >=51yo, 4)  American >=64yo (covered annually)         Health Risk Factors with Personalized Education:  ----------------------------------------------------------------------------------------------------------------------  Controlling Your Blood Pressure  Maintain a normal weight (body mass index between 18.5 and 24.9).  Eat more fruit, vegetables and low-fat dairy.  Eat less saturated fat and total fat.  Lower your sodium (salt) intake.  Try to stay under 1500 mg per day, but if you cannot get your intake to be that low, at least lower it by 1000 mg.  Stay active.  Try to get at least 90 to 150 minutes of exercise per week.  Try brisk walking, swimming, bicycling or dancing.  Limit alcohol intake.  When you do consume alcohol, drink no more than 2 drinks per day.  If you have been prescribed medication,  take it regularly and exactly as prescribed.  Let your PCP know if you have any problems or questions about your medication.  Check your blood pressure at home or at the store.  Write down your readings and share them with your PCP  ----------------------------------------------------------------------------------------------------------------------  Controlling Your Diabetes  Stress can raise your blood sugar.  Learn what causes your stress.  Learn to lower your stress by spending time with family and friends, exercising, deep breathing, trying meditation or yoga, enjoying hobbies and getting enough rest.  Let your PCP know if you’re having problems limiting your stress.  Maintain a healthy weight by balancing your diet and exercise.  Choose foods that are lower in calories, saturated fat, trans fat, sugar and salt.  Eat foods with more fiber, like whole grain cereals, bread, crackers, rice or pasta.  Choose to eat fruit, vegetables, and low-fat or fat-free/skim dairy.  Reduce the portion size of your meals.  Make half of your meal vegetables and fruit, and divide the other half between lean protein and whole grains.  Drink water instead of juice and regular soda.  Spend at least some time being active on most days of the week.  Work to increase your muscle strength at least twice per week.  Try things like light weights, stretch bands, yoga, heavy gardening (digging, planting with tools) or push-ups.  If you have been prescribed medication, take it regularly and exactly as prescribed.  Let your PCP know if you have any problems or questions about your medication.  If you have been asked to check your blood sugar, write down your readings and share them with your PCP  ----------------------------------------------------------------------------------------------------------------------  Controlling Your Cholesterol  Reduce the amount of saturated and trans fat in your diet.  Limit intake of red meat.  Consume only  low-fat or non-fat/skim dairy.  Limit fried food.  Cook with vegetable oils.  Reduce your intake of sugary foods, sugary drinks and alcohol.  Eat a diet high in fruit, vegetables and whole grains.  Get protein from fish, poultry and a small portion of nuts.  Stay active.  Try to get at least 90 to 150 minutes of exercise per week.  Try brisk walking, swimming, bicycling or dancing.  Maintain a healthy weight by balancing your diet and exercise.  If you have been prescribed medication, take it regularly and exactly as prescribed. Let your PCP know if you have any problems or questions about your medication.  It’s important to know your cholesterol numbers.  When recommended by your PCP, get the cholesterol blood test.  ----------------------------------------------------------------------------------------------------------------------  Reducing Your Risk of Falls  Tell your PCP if any of your medications make you feel tired, dizzy, lightheaded or off-balance.  Maintain coordination, flexibility and balance by ensuring regular physical activity.  Limit alcohol intake to 1 drink per day.  Consider avoiding all alcohol intake.  Ensure good vision.  Visit an ophthalmologist or optometrist regularly for vision screening or to make sure your glasses / contact lens prescription is correct.  If you need glasses or contacts, wear them.  When you get new glasses or contacts, take time to get used to them.  Do not wear sunglasses or tinted lenses when indoors.  Ensure good hearing.  Have your hearing checked if you are having trouble hearing, or family and friends think you cannot hear them.  If you need a hearing aid, be sure it fits well and wear it.  Get enough rest.  Ensure about 7-9 hours of sleep every day.  Get up slowly from your bed or chairs.  Do not start walking until you are sure you feel steady.  Wear non-skid, rubber-soled, low-heeled shoes.  Do not walk in socks, or in shoes and slippers with smooth soles.  If  your PCP or therapist recommends using a cane or walker, use it regularly.  Make your home safer.  Increase lighting throughout the house, especially at the top and bottom of stairs.  Ensure lighting is easily turned on when getting up in the middle of the night.  Make sure there are two secure rails on all stairs.  Install grab bars in the bathtub / shower and near the toilet.  Consider using a shower chair and / or a hand-held shower.  Spread sand or salt on icy surfaces.  Beware of wet surfaces, which can be icy.  Tell your PCP if you have fallen.

## 2025-02-27 NOTE — ASSESSMENT & PLAN NOTE
He does not tolerate CPAP and is not willing to consider retrying it.  He will work on his weight and he denies any daytime sleepiness.  He sleeps about 6 to 8 hours and wakes up feeling refreshed.

## 2025-02-27 NOTE — Clinical Note
Please request most recent diabetic eye exam done April 2024 from my eye doctor in [facility] in [place]

## 2025-02-27 NOTE — ASSESSMENT & PLAN NOTE
Cholesterol is not at goal on atorvastatin 10 mg daily and I increased him to 20 mg and we will repeat his blood work in 3 months.  Low-cholesterol diet was discussed and a handout was provided.  Orders:    atorvastatin (LIPITOR) 20 mg tablet; Take 1 tablet (20 mg total) by mouth daily.    Lipid panel; Future

## 2025-02-27 NOTE — ASSESSMENT & PLAN NOTE
Diabetes is not controlled with most recent hemoglobin A1c of 9.1% and with increasing microalbuminuria.  He will increase his metformin to twice a day like he is supposed to be taking it and work on decreasing pasta, bread and sugar from his diet as well as increasing his physical activity and losing weight before I decide to add another medication.  He is now seeing podiatry for his footcare every 2 months and will let me know the name of the podiatrist so I can get medical records.  He is due for his diabetic eye exam next month and we will call for the report from his exam April 2024 with my eye doctor in [place].  I will see him back in 3 months for recheck with a new set of blood tests.  Given his microalbuminuria we will consider adding lisinopril again at his next visit as he never started it when I ordered it for him 2 years ago.  Orders:    Hemoglobin A1c; Future    Comprehensive metabolic panel; Future

## 2025-02-27 NOTE — ASSESSMENT & PLAN NOTE
He lives a healthy, low risk lifestyle.  He is up-to-date with his immunizations except for COVID which he declines.  He is scheduled for his colonoscopy with Dr. Morales in May.

## 2025-02-27 NOTE — PROGRESS NOTES
"Subjective      Patient ID: Venu Phillips is a 70 y.o. male.  1955      This 70-year-old  male presents almost 2 years since I last saw him for his welcome to Medicare visit without any acute complaints.  He would like to review his recent blood work.  He states compliance with his medication although he never took the lisinopril I prescribed for him and he only takes his metformin once a day.  He does not check his blood sugars or his blood pressures between visits.  He does not eat a lot of sugar but admits to eating a lot of pasta, bread and red meat.    His past medical, surgical and social histories were reviewed.  He quit smoking more than 20 years ago after 20 pack years.  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  He also walks for exercise regularly and since the weather got cold bought a treadmill and walks 1-1/2 miles daily at a 2 mile an hour pace.        The following have been reviewed and updated as appropriate in this visit:   Tobacco  Allergies  Meds  Problems  Med Hx  Surg Hx  Fam Hx       Review of Systems   Constitutional: Negative.    HENT: Negative.     Eyes: Negative.    Respiratory: Negative.     Cardiovascular: Negative.    Gastrointestinal: Negative.    Genitourinary: Negative.    Musculoskeletal: Negative.    Neurological: Negative.    Psychiatric/Behavioral: Negative.         Objective     Vitals:    02/27/25 1517   BP: 134/82   Pulse: 71   Resp: 16   Temp: 36.4 °C (97.6 °F)   SpO2: 99%   Weight: 99.9 kg (220 lb 3.2 oz)   Height: 1.803 m (5' 11\")     Body mass index is 30.71 kg/m².    Physical Exam  Vitals reviewed.   Constitutional:       General: He is not in acute distress.     Appearance: He is not ill-appearing.      Comments: He is weight is stable.   HENT:      Right Ear: Tympanic membrane, ear canal and external ear normal.      Left Ear: Tympanic membrane, ear canal and external ear normal. "      Nose: Nose normal.   Neck:      Vascular: No carotid bruit.   Cardiovascular:      Rate and Rhythm: Normal rate and regular rhythm.      Heart sounds: Normal heart sounds.   Pulmonary:      Effort: Pulmonary effort is normal.      Breath sounds: Normal breath sounds.   Musculoskeletal:      Cervical back: Neck supple.      Right lower leg: No edema.      Left lower leg: No edema.   Lymphadenopathy:      Cervical: No cervical adenopathy.   Skin:     General: Skin is warm and dry.   Neurological:      General: No focal deficit present.      Mental Status: He is alert.   Psychiatric:         Mood and Affect: Mood normal.       Lab Results   Component Value Date    WBC 8.2 02/18/2025    HGB 14.8 02/18/2025    HCT 46.0 02/18/2025     02/18/2025    CHOL 190 02/18/2025    TRIG 116 02/18/2025    HDL 56 02/18/2025    ALT 19 02/18/2025    AST 20 02/18/2025     02/18/2025    K 4.7 02/18/2025     02/18/2025    CREATININE 1.00 02/18/2025    BUN 18 02/18/2025    CO2 30 02/18/2025    TSH 1.46 10/05/2023    PSA 0.7 04/26/2019    HGBA1C 9.1 (H) 02/18/2025    MICROALBUR 7.0 02/18/2025      Assessment & Plan  Medicare annual wellness visit, initial  He lives a healthy, low risk lifestyle.  He is up-to-date with his immunizations except for COVID which he declines.  He is scheduled for his colonoscopy with Dr. Morales in May.       Type 2 diabetes mellitus with microalbuminuria, without long-term current use of insulin (CMS/[facility])  Diabetes is not controlled with most recent hemoglobin A1c of 9.1% and with increasing microalbuminuria.  He will increase his metformin to twice a day like he is supposed to be taking it and work on decreasing pasta, bread and sugar from his diet as well as increasing his physical activity and losing weight before I decide to add another medication.  He is now seeing podiatry for his footcare every 2 months and will let me know the name of the podiatrist so I can get medical records.   He is due for his diabetic eye exam next month and we will call for the report from his exam April 2024 with my eye doctor in [place].  I will see him back in 3 months for recheck with a new set of blood tests.  Given his microalbuminuria we will consider adding lisinopril again at his next visit as he never started it when I ordered it for him 2 years ago.  Orders:    Hemoglobin A1c; Future    Comprehensive metabolic panel; Future    Screening for AAA (abdominal aortic aneurysm)    Orders:    ULTRASOUND ABDOMINAL AORTA (RETROPERITONEAL LIMITED); Future    Mixed hyperlipidemia  Cholesterol is not at goal on atorvastatin 10 mg daily and I increased him to 20 mg and we will repeat his blood work in 3 months.  Low-cholesterol diet was discussed and a handout was provided.  Orders:    atorvastatin (LIPITOR) 20 mg tablet; Take 1 tablet (20 mg total) by mouth daily.    Lipid panel; Future    Obstructive sleep apnea syndrome  He does not tolerate CPAP and is not willing to consider retrying it.  He will work on his weight and he denies any daytime sleepiness.  He sleeps about 6 to 8 hours and wakes up feeling refreshed.       Class 1 obesity due to excess calories with serious comorbidity and body mass index (BMI) of 30.0 to 30.9 in adult  I have asked him to lose 5 to 10 pounds between now and when I see him again.           Subjective     Venu Phillips is a 70 y.o. male who presents for an initial annual wellness visit.     Patient Care Team:  Lela Castillo MD as PCP - General (Family Medicine)  Michael Morales MD as Consulting Physician (Gastroenterology)    Comprehensive Medical and Social History  Patient Active Problem List   Diagnosis    Hyperlipidemia    Medicare annual wellness visit, initial    Type 2 diabetes mellitus with microalbuminuria, without long-term current use of insulin (CMS/[facility])    Obstructive sleep apnea syndrome    Class 1 obesity due to excess calories with serious comorbidity and body  "mass index (BMI) of 30.0 to 30.9 in adult     Past Medical History:   Diagnosis Date    Hypertension     Lipid disorder     Type 2 diabetes mellitus (CMS/HCC)      Past Surgical History   Procedure Laterality Date    Hernia repair       Allergies   Allergen Reactions    No Known Allergies      Current Outpatient Medications   Medication Sig Dispense Refill    atorvastatin (LIPITOR) 20 mg tablet Take 1 tablet (20 mg total) by mouth daily. 90 tablet 3    blood-glucose meter misc 1 kit daily. 50 each 11    glipiZIDE (GLUCOTROL XL) 10 mg 24 hr tablet Take 1 tablet (10 mg total) by mouth daily. 90 tablet 3    metFORMIN (GLUCOPHAGE) 1,000 mg tablet Take 1 tablet (1,000 mg total) by mouth 2 (two) times a day with meals. 180 tablet 3     No current facility-administered medications for this visit.     Social History     Tobacco Use    Smoking status: Former    Smokeless tobacco: Former   Substance Use Topics    Alcohol use: Yes    Drug use: No     Family History   Problem Relation Name Age of Onset    Heart disease Biological Mother      Heart disease Biological Father         Objective   Vitals  Vitals:    02/27/25 1517   BP: 134/82   Pulse: 71   Resp: 16   Temp: 36.4 °C (97.6 °F)   SpO2: 99%   Weight: 99.9 kg (220 lb 3.2 oz)   Height: 1.803 m (5' 11\")     Body mass index is 30.71 kg/m².    Advanced Care Plan  Does patient have advance directive?: No           Does patient have current OOH DNR form?: No           Does patient have current POLST?: No             PHQ  Will the patient answer the depression questions?: Yes   Little interest or pleasure in doing things: Not at all   Feeling down, depressed, or hopeless: Not at all   Depression Risk: 0                                             Mini Cog  Completed: Yes  Score: 5  Result: Negative      Get Up and Go  Result: Pass    STEADI Falls Risk  One or more falls in the last year: No           Has trouble stepping up onto a curb: No   Advised to use a cane or walker to " get around safely: No   Often has to rush to the toilet: No   Feels unsteady when walking: No   Has lost some feeling in feet: No   Often feels sad or depressed: No   Steadies self on furniture while walking at home: No   Takes medication that makes him/her feel lightheaded or more tired than usual: No   Worried about falling: No   Takes medicine to sleep or improve mood: No   Needs to push with hands when rising from a chair: No   Falls screen completed: Yes     Hearing and Vision Screening  Vision Screening    Right eye Left eye Both eyes   Without correction 20/25 20/30 20/25   With correction        See HRA for relevant hearing screening response.    Diet and Exercise  Do you exercise regularly?: Yes   Do you feel that your diet is healthy?: Yes     Assessment/Plan   Diagnoses and all orders for this visit:    Medicare annual wellness visit, initial (Primary)    Type 2 diabetes mellitus with microalbuminuria, without long-term current use of insulin (CMS/[facility])  -     Hemoglobin A1c; Future  -     Comprehensive metabolic panel; Future    Screening for AAA (abdominal aortic aneurysm)  -     ULTRASOUND ABDOMINAL AORTA (RETROPERITONEAL LIMITED); Future    Mixed hyperlipidemia  -     atorvastatin (LIPITOR) 20 mg tablet; Take 1 tablet (20 mg total) by mouth daily.  -     Lipid panel; Future    Obstructive sleep apnea syndrome    Class 1 obesity due to excess calories with serious comorbidity and body mass index (BMI) of 30.0 to 30.9 in adult        See Patient Instructions (the written plan) which was given to the patient for PPPS and health risk factors with interventions.

## 2025-03-06 ENCOUNTER — HOSPITAL ENCOUNTER (OUTPATIENT)
Facility: CLINIC | Age: 70
Discharge: OTHER INSTITUTION | End: 2025-03-06
Attending: EMERGENCY MEDICINE
Payer: MEDICARE

## 2025-03-06 ENCOUNTER — NURSE TRIAGE (OUTPATIENT)
Dept: FAMILY MEDICINE | Facility: CLINIC | Age: 70
End: 2025-03-06
Payer: MEDICARE

## 2025-03-06 VITALS
OXYGEN SATURATION: 98 % | HEART RATE: 74 BPM | SYSTOLIC BLOOD PRESSURE: 157 MMHG | DIASTOLIC BLOOD PRESSURE: 84 MMHG | TEMPERATURE: 98.4 F

## 2025-03-06 DIAGNOSIS — R06.00 DYSPNEA, UNSPECIFIED TYPE: Primary | ICD-10-CM

## 2025-03-06 PROCEDURE — 99215 OFFICE O/P EST HI 40 MIN: CPT | Performed by: EMERGENCY MEDICINE

## 2025-03-06 RX ORDER — METHYLPREDNISOLONE 4 MG/1
TABLET ORAL
Qty: 21 TABLET | Refills: 0 | Status: SHIPPED | OUTPATIENT
Start: 2025-03-06

## 2025-03-06 RX ORDER — AZITHROMYCIN 250 MG/1
TABLET, FILM COATED ORAL
Qty: 6 TABLET | Refills: 0 | Status: SHIPPED | OUTPATIENT
Start: 2025-03-06

## 2025-03-06 ASSESSMENT — ENCOUNTER SYMPTOMS
SHORTNESS OF BREATH: 1
BACK PAIN: 0
CHILLS: 0
VOMITING: 0
WHEEZING: 1
PALPITATIONS: 0
HEMATURIA: 0
SORE THROAT: 0
COUGH: 0
ARTHRALGIAS: 0
COLOR CHANGE: 0
EYE PAIN: 0
FEVER: 0
DYSURIA: 0
ABDOMINAL PAIN: 0
SEIZURES: 0

## 2025-03-06 NOTE — TELEPHONE ENCOUNTER
Patient transferred to the Primary Care Telephonic Nurse Triage Line with complaints of cough and sob.    HPI:  Patient's wife, Mane, reports patient has had a cough since Friday.  Cough is moderate to severe.  Cough is productive.  Producing yellow and clear sputum.    Also reports shortness of breath that began yesterday and occurs with and without cough.  Unable to sleep or get comfortable.  Sob worse at night.    Reports wheezing that occurs at night and when lying down.  Also reports hoarse voice.    Denies fever, runny nose, chest pain, or any other symptoms.    Disposition:  Go to Office Now  No office appointments available today.  Mane (wife) agreed to take patient to  today and stated she would take him to [facility] in [place].     Care Advice:  Care Advice Given    Patient/Caregiver understands and will follow care advice?: Yes, plans to follow advice      Given By Given At [facility] 3/6/2025  9:54 AM Yes           GO TO OFFICE OR VIDEO VISIT NOW:   * You need to be examined or have a video telemedicine visit.  * PCP OFFICE VISIT: No office appointments available today.  Mane (wife) agreed to take patient to  today and stated she would take him to [facility] in [place].      [facility] 3/6/2025  9:54 AM No           COUGH MEDICINES:  * COUGH DROPS: Over-the-counter cough drops can help a lot, especially for mild coughs. They soothe an irritated throat and remove the tickle sensation in the back of the throat. Cough drops are easy to carry with you.  * COUGH SYRUP WITH DEXTROMETHORPHAN: An over-the-counter cough syrup can help your cough. The most common cough suppressant in over-the-counter cough medicines is dextromethorphan.  * HOME REMEDY - HARD CANDY: Hard candy works just as well as over-the-counter cough drops. People who have diabetes should use sugar-free candy.  * HOME REMEDY - HONEY: This old home remedy has been shown to help decrease coughing at night. The adult dosage  is 2 teaspoons (10 ml) at bedtime.    [facility] 3/6/2025  9:54 AM No           COUGH SYRUP WITH DEXTROMETHORPHAN:  * Cough syrups containing the cough suppressant dextromethorphan may help decrease your cough.  * Cough syrup works best for coughs that keep you awake at night. It can also sometimes help in the late stages of a lung or airway infection when the cough is dry and hacking. Cough syrup can be used along with cough drops.  * Examples: Delsym 12-hour Cough, Robitussin Cough Long-Acting, Triaminic Long-Acting, Vicks DayQuil Cough.    [facility] 3/6/2025  9:54 AM No           COUGH SYRUP WITH DEXTROMETHORPHAN - EXTRA NOTES AND WARNINGS:  * Do not try to completely stop coughs that produce mucus and phlegm.  * Coughing is helpful. It brings up the mucus from the lungs and helps prevent pneumonia.  * RESEARCH: Some research studies show that dextromethorphan reduces the frequency and severity of cough in those 18 years and older without significant adverse effects. Other studies suggest that dextromethorphan is no better than placebo at reducing a cough.  * DRUG ABUSE: Dextromethorphan has become a drug of abuse. This problem is seen most often in teenagers. Overdose symptoms can range from giggling and feeling high to hallucinations and coma.  * WARNING: Do not take dextromethorphan if you are taking a monoamine oxidase (MAO) inhibitor now or in the past 2 weeks. Examples of MAO inhibitors include isocarboxazid (Marplan), phenelzine (Nardil), selegiline (Eldepryl, Emsam, Zelapar), and tranylcypromine (Parnate).  * WARNING: Do not take dextromethorphan if you are taking venlafaxine (Effexor).  * Before taking any medicine, read all the instructions on the package.    [facility] 3/6/2025  9:54 AM No           AVOID TOBACCO SMOKE:  * Avoid smoke from tobacco and e-cigarettes.  * Smoking or being exposed to smoke makes coughs much worse.    [facility] 3/6/2025  9:54 AM No            "COUGHING SPELLS:  * Drink warm fluids. Inhale warm mist. This can help relax the airway and also loosen up phlegm.  * Suck on cough drops or hard candy to coat the irritated throat.    Valerie Larsen 3/6/2025  9:54 AM Yes           CALL BACK IF:  * Difficulty breathing worsens  * Fever occurs  * You become worse             Reason for Disposition   MILD difficulty breathing (e.g., minimal/no SOB at rest, SOB with walking, pulse < 100) and still present when not coughing  (Exception: No change from usual, chronic shortness of breath.)    Answer Assessment - Initial Assessment Questions  1. ONSET: \"When did the cough begin?\"       Friday  2. SEVERITY: \"How bad is the cough today?\"       Moderate to severe  3. SPUTUM: \"Describe the color of your sputum\" (e.g., none, dry cough; clear, white, yellow, green)      Yellow/clear  4. HEMOPTYSIS: \"Are you coughing up any blood?\" If so ask: \"How much?\" (e.g., flecks, streaks, tablespoons, etc.)      No  5. DIFFICULTY BREATHING: \"Are you having difficulty breathing?\" If Yes, ask: \"How bad is it?\" (e.g., mild, moderate, severe)       Mild  6. FEVER: \"Do you have a fever?\" If Yes, ask: \"What is your temperature, how was it measured, and when did it start?\"      No  7. CARDIAC HISTORY: \"Do you have any history of heart disease?\" (e.g., heart attack, congestive heart failure)       No  8. LUNG HISTORY: \"Do you have any history of lung disease?\"  (e.g., pulmonary embolus, asthma, emphysema)      No  9. PE RISK FACTORS: \"Do you have a history of blood clots?\" (or: recent major surgery, recent prolonged travel, bedridden)      No  10. OTHER SYMPTOMS: \"Do you have any other symptoms?\" (e.g., runny nose, wheezing, chest pain)        Wheezing at night.  12. TRAVEL: \"Have you traveled out of the country in the last month?\" (e.g., travel history, exposures)        No    Protocols used: Cough - Chronic-Adult-[place]    "

## 2025-03-06 NOTE — TELEPHONE ENCOUNTER
Regarding: Red Flag Trouble Breathing  ----- Message from Sara CLEMENTS sent at 3/6/2025  9:37 AM EST -----  Patient called today with red flag complaint of possible pneumonia, trouble breathing, lots of coughing.  Call transferred to Primary Care Nurse Triage Line

## 2025-03-06 NOTE — ED PROVIDER NOTES
History  Chief Complaint   Patient presents with    chest congestion      States he is having a hard time sleeping , hard to catch his  breath , some wheezing      Pt reports dyspnea, chest congestion, wheezing. No chest pain or presyncope.           Past Medical History:   Diagnosis Date    Hypertension     Lipid disorder     Type 2 diabetes mellitus (CMS/HCC)        Past Surgical History   Procedure Laterality Date    Hernia repair         Family History   Problem Relation Name Age of Onset    Heart disease Biological Mother      Heart disease Biological Father         Social History     Tobacco Use    Smoking status: Former    Smokeless tobacco: Former   Substance Use Topics    Alcohol use: Yes    Drug use: No       Review of Systems   Constitutional:  Negative for chills and fever.   HENT:  Negative for ear pain and sore throat.    Eyes:  Negative for pain and visual disturbance.   Respiratory:  Positive for shortness of breath and wheezing. Negative for cough.    Cardiovascular:  Negative for chest pain and palpitations.   Gastrointestinal:  Negative for abdominal pain and vomiting.   Genitourinary:  Negative for dysuria and hematuria.   Musculoskeletal:  Negative for arthralgias and back pain.   Skin:  Negative for color change and rash.   Neurological:  Negative for seizures and syncope.   All other systems reviewed and are negative.      Physical Exam  ED Triage Vitals [03/06/25 1052]   Temp Heart Rate Resp BP SpO2   36.9 °C (98.4 °F) 74 -- (!) 157/84 98 %      Temp src Heart Rate Source Patient Position BP Location FiO2 (%) (Set)   -- Monitor Sitting Left upper arm --       Physical Exam  Vitals and nursing note reviewed.   Constitutional:       General: He is not in acute distress.     Appearance: Normal appearance. He is well-developed. He is not ill-appearing, toxic-appearing or diaphoretic.   HENT:      Head: Normocephalic and atraumatic.      Nose: No congestion or rhinorrhea.   Eyes:      General:          Right eye: No discharge.         Left eye: No discharge.      Extraocular Movements: Extraocular movements intact.      Conjunctiva/sclera: Conjunctivae normal.   Cardiovascular:      Rate and Rhythm: Normal rate.      Pulses: Normal pulses.      Heart sounds: Normal heart sounds.   Pulmonary:      Effort: Pulmonary effort is normal. No respiratory distress.      Breath sounds: No stridor. Wheezing and rhonchi present. No rales.   Abdominal:      General: Abdomen is flat. There is no distension.   Musculoskeletal:         General: No deformity or signs of injury.      Cervical back: Normal range of motion.   Skin:     General: Skin is warm and dry.      Coloration: Skin is not jaundiced or pale.   Neurological:      General: No focal deficit present.      Mental Status: He is alert and oriented to person, place, and time. Mental status is at baseline.   Psychiatric:         Mood and Affect: Mood normal.         Behavior: Behavior normal.           Procedures  Procedures    UC Course       Medical Decision Making  Possible bronchitis vs other serious cardiopulmonary pathology. Pt referred to ER. Declined ambulance. Pt has dyspnea and orthopnea with recent infection. Will start medications for after ER evaluation. Pt seemed it indicated he would not likely go to the ER [facility].                      Mariano Zacarias MD  03/06/25 1111

## 2025-03-20 ENCOUNTER — TELEPHONE (OUTPATIENT)
Dept: FAMILY MEDICINE | Facility: CLINIC | Age: 70
End: 2025-03-20
Payer: MEDICARE

## 2025-03-20 DIAGNOSIS — Z87.891 EX-SMOKER: Primary | ICD-10-CM

## 2025-03-21 ENCOUNTER — TELEPHONE (OUTPATIENT)
Dept: PULMONOLOGY | Facility: HOSPITAL | Age: 70
End: 2025-03-21
Payer: MEDICARE

## 2025-03-21 ENCOUNTER — TRANSCRIBE ORDERS (OUTPATIENT)
Dept: SCHEDULING | Age: 70
End: 2025-03-21

## 2025-03-28 ENCOUNTER — RESULTS FOLLOW-UP (OUTPATIENT)
Dept: FAMILY MEDICINE | Facility: CLINIC | Age: 70
End: 2025-03-28

## 2025-03-28 ENCOUNTER — HOSPITAL ENCOUNTER (OUTPATIENT)
Dept: RADIOLOGY | Age: 70
Discharge: HOME | End: 2025-03-28
Attending: FAMILY MEDICINE
Payer: MEDICARE

## 2025-03-28 DIAGNOSIS — Z13.6 SCREENING FOR AAA (ABDOMINAL AORTIC ANEURYSM): ICD-10-CM

## 2025-03-28 PROCEDURE — 76775 US EXAM ABDO BACK WALL LIM: CPT

## 2025-04-09 ENCOUNTER — TELEPHONE (OUTPATIENT)
Dept: FAMILY MEDICINE | Facility: CLINIC | Age: 70
End: 2025-04-09
Payer: MEDICARE

## 2025-04-09 NOTE — TELEPHONE ENCOUNTER
----- Message from Lela Castillo sent at 2/27/2025  5:26 PM EST -----  Please request most recent diabetic eye exam done April 2024 from my eye doctor in [facility] in [place]

## 2025-05-07 ENCOUNTER — HOSPITAL ENCOUNTER (OUTPATIENT)
Dept: RADIOLOGY | Age: 70
Discharge: HOME | End: 2025-05-07
Attending: FAMILY MEDICINE
Payer: MEDICARE

## 2025-05-07 DIAGNOSIS — Z87.891 EX-SMOKER: ICD-10-CM

## 2025-05-07 PROCEDURE — 71271 CT THORAX LUNG CANCER SCR C-: CPT

## 2025-05-08 ENCOUNTER — RESULTS FOLLOW-UP (OUTPATIENT)
Dept: FAMILY MEDICINE | Facility: CLINIC | Age: 70
End: 2025-05-08

## 2025-05-08 ENCOUNTER — TELEPHONE (OUTPATIENT)
Dept: FAMILY MEDICINE | Facility: CLINIC | Age: 70
End: 2025-05-08
Payer: MEDICARE

## 2025-05-28 DIAGNOSIS — R80.9 TYPE 2 DIABETES MELLITUS WITH MICROALBUMINURIA, WITHOUT LONG-TERM CURRENT USE OF INSULIN (CMS/HCC): Primary | ICD-10-CM

## 2025-05-28 DIAGNOSIS — E11.29 TYPE 2 DIABETES MELLITUS WITH MICROALBUMINURIA, WITHOUT LONG-TERM CURRENT USE OF INSULIN (CMS/HCC): Primary | ICD-10-CM

## 2025-05-28 RX ORDER — LISINOPRIL 5 MG/1
5 TABLET ORAL DAILY
COMMUNITY
End: 2025-05-28 | Stop reason: SDUPTHER

## 2025-05-28 RX ORDER — LISINOPRIL 5 MG/1
5 TABLET ORAL DAILY
Qty: 90 TABLET | Refills: 3 | Status: SHIPPED | OUTPATIENT
Start: 2025-05-28 | End: 2026-05-28

## 2025-07-02 ENCOUNTER — OFFICE VISIT (OUTPATIENT)
Dept: FAMILY MEDICINE | Facility: CLINIC | Age: 70
End: 2025-07-02
Payer: MEDICARE

## 2025-07-02 VITALS
BODY MASS INDEX: 30.38 KG/M2 | TEMPERATURE: 98 F | HEIGHT: 71 IN | DIASTOLIC BLOOD PRESSURE: 70 MMHG | WEIGHT: 217 LBS | RESPIRATION RATE: 16 BRPM | SYSTOLIC BLOOD PRESSURE: 130 MMHG | HEART RATE: 64 BPM | OXYGEN SATURATION: 97 %

## 2025-07-02 DIAGNOSIS — R80.9 TYPE 2 DIABETES MELLITUS WITH MICROALBUMINURIA, WITHOUT LONG-TERM CURRENT USE OF INSULIN (CMS/HCC): Primary | ICD-10-CM

## 2025-07-02 DIAGNOSIS — E11.29 TYPE 2 DIABETES MELLITUS WITH MICROALBUMINURIA, WITHOUT LONG-TERM CURRENT USE OF INSULIN (CMS/HCC): Primary | ICD-10-CM

## 2025-07-02 PROCEDURE — 99214 OFFICE O/P EST MOD 30 MIN: CPT | Performed by: NURSE PRACTITIONER

## 2025-07-02 ASSESSMENT — PATIENT HEALTH QUESTIONNAIRE - PHQ9: SUM OF ALL RESPONSES TO PHQ9 QUESTIONS 1 & 2: 0

## 2025-07-02 NOTE — PROGRESS NOTES
"  Subjective     Patient ID: Venu Phillips is a 70 y.o. male.    HPI    Here today for diabetes follow up    1. Diabetes     Last A1C   Lab Results   Component Value Date    HGBA1C 9.1 (H) 02/18/2025     Last lipid panel   Lab Results   Component Value Date    HDL 56 02/18/2025    TRIG 116 02/18/2025    LDLCALC 112 (H) 02/18/2025    CHOLHDLRAT 3.4 02/18/2025    NONHDLCHOL 134 (H) 02/18/2025     Last microalbumin  Lab Results   Component Value Date    MICROALBUR 7.0 02/18/2025       Blood glucose monitoring: none    Current medications: glipizide 10 mg daily, metformin  1000 mg BID    Diet needs some work     Exercise treadmill    Foot exam: current  Eye exam:        Current Outpatient Medications:     atorvastatin (LIPITOR) 20 mg tablet, Take 1 tablet (20 mg total) by mouth daily., Disp: 90 tablet, Rfl: 3    blood-glucose meter misc, 1 kit daily., Disp: 50 each, Rfl: 11    glipiZIDE (GLUCOTROL XL) 10 mg 24 hr tablet, Take 1 tablet (10 mg total) by mouth daily., Disp: 90 tablet, Rfl: 3    lisinopriL (PRINIVIL) 5 mg tablet, Take 1 tablet (5 mg total) by mouth daily., Disp: 90 tablet, Rfl: 3    metFORMIN (GLUCOPHAGE) 1,000 mg tablet, Take 1 tablet (1,000 mg total) by mouth 2 (two) times a day with meals., Disp: 180 tablet, Rfl: 3    azithromycin (ZITHROMAX) 250 mg tablet, Take 2 tablets the first day, then 1 tablet daily for 4 days., Disp: 6 tablet, Rfl: 0    methylPREDNISolone (MEDROL DOSEPACK) 4 mg tablet, D1: 2 tab after meals & 2 at sleep; D2: 1 tab after meals & 2 at sleep; D3: 1 tab before meals & 1 at sleep; D4: 1 tab before bf & jaqui, & 1 at sleep; D5: 1 tab before bf & 1 at sleep; D6: 1 tab before bf, Disp: 21 tablet, Rfl: 0    Vitals:    07/02/25 0904   BP: 130/70   BP Location: Left upper arm   Patient Position: Sitting   Pulse: 64   Resp: 16   Temp: 36.7 °C (98 °F)   TempSrc: Oral   SpO2: 97%   Weight: 98.4 kg (217 lb)   Height: 1.803 m (5' 11\")     Body mass index is 30.27 kg/m².    Review of Systems   All " other systems reviewed and are negative.      Objective   .      Physical Exam  Vitals reviewed.   Constitutional:       Appearance: Normal appearance.   Cardiovascular:      Rate and Rhythm: Normal rate and regular rhythm.   Pulmonary:      Effort: Pulmonary effort is normal.      Breath sounds: Normal breath sounds.   Neurological:      Mental Status: He is alert.         Assessment/Plan   Diagnoses and all orders for this visit:    Type 2 diabetes mellitus with microalbuminuria, without long-term current use of insulin (CMS/[facility]) (Primary)  Assessment & Plan:  Venu's most recent A1c, 2/2025, was 9.1. Patient encouraged to adhere to prescribed regimen and to obtain repeat blood work.  Patient counseled on diet and exercise.

## 2025-07-03 NOTE — ASSESSMENT & PLAN NOTE
Venu's most recent A1c, 2/2025, was 9.1. Patient encouraged to adhere to prescribed regimen and to obtain repeat blood work.  Patient counseled on diet and exercise.

## 2025-08-28 PROCEDURE — 99490 CHRNC CARE MGMT STAFF 1ST 20: CPT

## 2025-09-02 DIAGNOSIS — E11.29 TYPE 2 DIABETES MELLITUS WITH MICROALBUMINURIA, WITHOUT LONG-TERM CURRENT USE OF INSULIN (CMS/HCC): ICD-10-CM

## 2025-09-02 DIAGNOSIS — R80.9 TYPE 2 DIABETES MELLITUS WITH MICROALBUMINURIA, WITHOUT LONG-TERM CURRENT USE OF INSULIN (CMS/HCC): ICD-10-CM

## 2025-09-02 RX ORDER — GLIPIZIDE 10 MG/1
10 TABLET, FILM COATED, EXTENDED RELEASE ORAL DAILY
Qty: 90 TABLET | Refills: 3 | Status: SHIPPED | OUTPATIENT
Start: 2025-09-02